# Patient Record
Sex: FEMALE | Race: BLACK OR AFRICAN AMERICAN | NOT HISPANIC OR LATINO | Employment: FULL TIME | URBAN - METROPOLITAN AREA
[De-identification: names, ages, dates, MRNs, and addresses within clinical notes are randomized per-mention and may not be internally consistent; named-entity substitution may affect disease eponyms.]

---

## 2022-06-08 ENCOUNTER — HOSPITAL ENCOUNTER (OUTPATIENT)
Facility: HOSPITAL | Age: 42
LOS: 1 days | Discharge: HOME | End: 2022-06-09
Attending: INTERNAL MEDICINE | Admitting: INTERNAL MEDICINE
Payer: MEDICAID

## 2022-06-08 DIAGNOSIS — K52.9 AGE (ACUTE GASTROENTERITIS): Primary | ICD-10-CM

## 2022-06-08 DIAGNOSIS — K80.20 CALCULUS OF GALLBLADDER WITHOUT CHOLECYSTITIS WITHOUT OBSTRUCTION: ICD-10-CM

## 2022-06-08 PROBLEM — I10 HYPERTENSION: Status: ACTIVE | Noted: 2022-06-08

## 2022-06-08 PROBLEM — E03.9 HYPOTHYROIDISM: Status: ACTIVE | Noted: 2022-06-08

## 2022-06-08 PROBLEM — I82.622 ACUTE DEEP VEIN THROMBOSIS (DVT) OF BRACHIAL VEIN OF LEFT UPPER EXTREMITY: Status: ACTIVE | Noted: 2022-06-08

## 2022-06-08 PROBLEM — E03.9 HYPOTHYROIDISM: Chronic | Status: ACTIVE | Noted: 2022-06-08

## 2022-06-08 PROBLEM — K57.30 DIVERTICULOSIS OF COLON: Status: ACTIVE | Noted: 2022-06-08

## 2022-06-08 PROBLEM — E66.01 MORBID OBESITY: Chronic | Status: ACTIVE | Noted: 2022-06-08

## 2022-06-08 PROBLEM — I10 HYPERTENSION: Chronic | Status: ACTIVE | Noted: 2022-06-08

## 2022-06-08 PROBLEM — R10.9 ACUTE LEFT FLANK PAIN: Status: ACTIVE | Noted: 2022-06-08

## 2022-06-08 LAB
ALBUMIN SERPL-MCNC: 3.6 GM/DL (ref 3.5–5)
ALBUMIN/GLOB SERPL: 0.7 RATIO (ref 1.1–2)
ALP SERPL-CCNC: 47 UNIT/L (ref 40–150)
ALT SERPL-CCNC: 12 UNIT/L (ref 0–55)
APPEARANCE UR: ABNORMAL
AST SERPL-CCNC: 17 UNIT/L (ref 5–34)
B-HCG SERPL QL: NEGATIVE
BACTERIA #/AREA URNS AUTO: ABNORMAL /HPF
BASOPHILS # BLD AUTO: 0.06 X10(3)/MCL (ref 0–0.2)
BASOPHILS NFR BLD AUTO: 0.7 %
BILIRUB UR QL STRIP.AUTO: NEGATIVE MG/DL
BILIRUBIN DIRECT+TOT PNL SERPL-MCNC: 0.5 MG/DL
BUN SERPL-MCNC: 8 MG/DL (ref 7–18.7)
CALCIUM SERPL-MCNC: 10.3 MG/DL (ref 8.4–10.2)
CHLORIDE SERPL-SCNC: 105 MMOL/L (ref 98–107)
CO2 SERPL-SCNC: 22 MMOL/L (ref 22–29)
COLOR UR AUTO: ABNORMAL
CREAT SERPL-MCNC: 0.89 MG/DL (ref 0.55–1.02)
EOSINOPHIL # BLD AUTO: 0.01 X10(3)/MCL (ref 0–0.9)
EOSINOPHIL NFR BLD AUTO: 0.1 %
ERYTHROCYTE [DISTWIDTH] IN BLOOD BY AUTOMATED COUNT: 12.6 % (ref 11.5–17)
GLOBULIN SER-MCNC: 5.3 GM/DL (ref 2.4–3.5)
GLUCOSE SERPL-MCNC: 122 MG/DL (ref 74–100)
GLUCOSE UR QL STRIP.AUTO: NEGATIVE MG/DL
HCT VFR BLD AUTO: 36.8 % (ref 37–47)
HGB BLD-MCNC: 11.8 GM/DL (ref 12–16)
IMM GRANULOCYTES # BLD AUTO: 0.03 X10(3)/MCL (ref 0–0.02)
IMM GRANULOCYTES NFR BLD AUTO: 0.4 % (ref 0–0.43)
KETONES UR QL STRIP.AUTO: NEGATIVE MG/DL
LEUKOCYTE ESTERASE UR QL STRIP.AUTO: NEGATIVE UNIT/L
LYMPHOCYTES # BLD AUTO: 1.52 X10(3)/MCL (ref 0.6–4.6)
LYMPHOCYTES NFR BLD AUTO: 18.2 %
MCH RBC QN AUTO: 29.8 PG (ref 27–31)
MCHC RBC AUTO-ENTMCNC: 32.1 MG/DL (ref 33–36)
MCV RBC AUTO: 92.9 FL (ref 80–94)
MONOCYTES # BLD AUTO: 0.3 X10(3)/MCL (ref 0.1–1.3)
MONOCYTES NFR BLD AUTO: 3.6 %
NEUTROPHILS # BLD AUTO: 6.4 X10(3)/MCL (ref 2.1–9.2)
NEUTROPHILS NFR BLD AUTO: 77 %
NITRITE UR QL STRIP.AUTO: NEGATIVE
PH UR STRIP.AUTO: 7.5 [PH]
PLATELET # BLD AUTO: 211 X10(3)/MCL (ref 130–400)
PMV BLD AUTO: 10.2 FL (ref 9.4–12.4)
POTASSIUM SERPL-SCNC: 3.7 MMOL/L (ref 3.5–5.1)
PROT SERPL-MCNC: 8.9 GM/DL (ref 6.4–8.3)
PROT UR QL STRIP.AUTO: ABNORMAL MG/DL
RBC # BLD AUTO: 3.96 X10(6)/MCL (ref 4.2–5.4)
RBC #/AREA URNS AUTO: ABNORMAL /HPF
RBC UR QL AUTO: ABNORMAL UNIT/L
SARS-COV-2 RNA RESP QL NAA+PROBE: NOT DETECTED
SODIUM SERPL-SCNC: 136 MMOL/L (ref 136–145)
SP GR UR STRIP.AUTO: 1.02
SQUAMOUS #/AREA URNS AUTO: ABNORMAL /LPF
UROBILINOGEN UR STRIP-ACNC: 0.2 MG/DL
WBC # SPEC AUTO: 8.4 X10(3)/MCL (ref 4.5–11.5)
WBC #/AREA URNS AUTO: ABNORMAL /HPF

## 2022-06-08 PROCEDURE — 63600175 PHARM REV CODE 636 W HCPCS: Performed by: INTERNAL MEDICINE

## 2022-06-08 PROCEDURE — 11000001 HC ACUTE MED/SURG PRIVATE ROOM

## 2022-06-08 PROCEDURE — 96374 THER/PROPH/DIAG INJ IV PUSH: CPT

## 2022-06-08 PROCEDURE — 80053 COMPREHEN METABOLIC PANEL: CPT | Performed by: INTERNAL MEDICINE

## 2022-06-08 PROCEDURE — 94761 N-INVAS EAR/PLS OXIMETRY MLT: CPT

## 2022-06-08 PROCEDURE — G0378 HOSPITAL OBSERVATION PER HR: HCPCS

## 2022-06-08 PROCEDURE — A4216 STERILE WATER/SALINE, 10 ML: HCPCS | Performed by: INTERNAL MEDICINE

## 2022-06-08 PROCEDURE — 81001 URINALYSIS AUTO W/SCOPE: CPT | Performed by: INTERNAL MEDICINE

## 2022-06-08 PROCEDURE — 96372 THER/PROPH/DIAG INJ SC/IM: CPT | Performed by: INTERNAL MEDICINE

## 2022-06-08 PROCEDURE — 99285 EMERGENCY DEPT VISIT HI MDM: CPT | Mod: 25

## 2022-06-08 PROCEDURE — 96372 THER/PROPH/DIAG INJ SC/IM: CPT | Mod: 59 | Performed by: INTERNAL MEDICINE

## 2022-06-08 PROCEDURE — 87635 SARS-COV-2 COVID-19 AMP PRB: CPT | Performed by: INTERNAL MEDICINE

## 2022-06-08 PROCEDURE — 85025 COMPLETE CBC W/AUTO DIFF WBC: CPT | Performed by: INTERNAL MEDICINE

## 2022-06-08 PROCEDURE — 36415 COLL VENOUS BLD VENIPUNCTURE: CPT | Performed by: INTERNAL MEDICINE

## 2022-06-08 PROCEDURE — 25000003 PHARM REV CODE 250: Performed by: INTERNAL MEDICINE

## 2022-06-08 PROCEDURE — 81025 URINE PREGNANCY TEST: CPT | Performed by: INTERNAL MEDICINE

## 2022-06-08 RX ORDER — KETOROLAC TROMETHAMINE 30 MG/ML
15 INJECTION, SOLUTION INTRAMUSCULAR; INTRAVENOUS EVERY 8 HOURS PRN
Status: DISCONTINUED | OUTPATIENT
Start: 2022-06-08 | End: 2022-06-09 | Stop reason: HOSPADM

## 2022-06-08 RX ORDER — ACETAMINOPHEN 325 MG/1
650 TABLET ORAL EVERY 6 HOURS PRN
Status: DISCONTINUED | OUTPATIENT
Start: 2022-06-08 | End: 2022-06-09 | Stop reason: HOSPADM

## 2022-06-08 RX ORDER — PROCHLORPERAZINE EDISYLATE 5 MG/ML
2.5 INJECTION INTRAMUSCULAR; INTRAVENOUS EVERY 6 HOURS PRN
Status: DISCONTINUED | OUTPATIENT
Start: 2022-06-08 | End: 2022-06-09 | Stop reason: HOSPADM

## 2022-06-08 RX ORDER — LEVOTHYROXINE SODIUM 75 UG/1
75 TABLET ORAL DAILY
COMMUNITY
Start: 2022-04-09 | End: 2022-08-25 | Stop reason: SDUPTHER

## 2022-06-08 RX ORDER — ACETAMINOPHEN 325 MG/1
650 TABLET ORAL EVERY 8 HOURS PRN
Status: DISCONTINUED | OUTPATIENT
Start: 2022-06-08 | End: 2022-06-08

## 2022-06-08 RX ORDER — MAG HYDROX/ALUMINUM HYD/SIMETH 200-200-20
30 SUSPENSION, ORAL (FINAL DOSE FORM) ORAL EVERY 6 HOURS PRN
Status: DISCONTINUED | OUTPATIENT
Start: 2022-06-08 | End: 2022-06-09 | Stop reason: HOSPADM

## 2022-06-08 RX ORDER — METHOCARBAMOL 500 MG/1
500 TABLET, FILM COATED ORAL EVERY 6 HOURS PRN
Status: DISCONTINUED | OUTPATIENT
Start: 2022-06-08 | End: 2022-06-09 | Stop reason: HOSPADM

## 2022-06-08 RX ORDER — KETOROLAC TROMETHAMINE 30 MG/ML
30 INJECTION, SOLUTION INTRAMUSCULAR; INTRAVENOUS ONCE
Status: DISCONTINUED | OUTPATIENT
Start: 2022-06-08 | End: 2022-06-08

## 2022-06-08 RX ORDER — AMLODIPINE BESYLATE 5 MG/1
5 TABLET ORAL DAILY
COMMUNITY
Start: 2022-04-09

## 2022-06-08 RX ORDER — DIPHENHYDRAMINE HYDROCHLORIDE 50 MG/ML
12.5 INJECTION INTRAMUSCULAR; INTRAVENOUS EVERY 6 HOURS PRN
Status: DISCONTINUED | OUTPATIENT
Start: 2022-06-08 | End: 2022-06-09 | Stop reason: HOSPADM

## 2022-06-08 RX ORDER — HYDROMORPHONE HYDROCHLORIDE 2 MG/ML
0.5 INJECTION, SOLUTION INTRAMUSCULAR; INTRAVENOUS; SUBCUTANEOUS EVERY 4 HOURS PRN
Status: DISCONTINUED | OUTPATIENT
Start: 2022-06-08 | End: 2022-06-08

## 2022-06-08 RX ORDER — ONDANSETRON 2 MG/ML
8 INJECTION INTRAMUSCULAR; INTRAVENOUS EVERY 6 HOURS PRN
Status: DISCONTINUED | OUTPATIENT
Start: 2022-06-08 | End: 2022-06-09 | Stop reason: HOSPADM

## 2022-06-08 RX ORDER — MEPERIDINE HYDROCHLORIDE 50 MG/ML
25 INJECTION INTRAMUSCULAR; INTRAVENOUS; SUBCUTANEOUS ONCE
Status: COMPLETED | OUTPATIENT
Start: 2022-06-08 | End: 2022-06-08

## 2022-06-08 RX ORDER — SODIUM CHLORIDE 0.9 % (FLUSH) 0.9 %
10 SYRINGE (ML) INJECTION EVERY 8 HOURS
Status: DISCONTINUED | OUTPATIENT
Start: 2022-06-08 | End: 2022-06-09 | Stop reason: HOSPADM

## 2022-06-08 RX ORDER — LOPERAMIDE HYDROCHLORIDE 2 MG/1
2 CAPSULE ORAL 4 TIMES DAILY PRN
Status: DISCONTINUED | OUTPATIENT
Start: 2022-06-08 | End: 2022-06-09 | Stop reason: HOSPADM

## 2022-06-08 RX ORDER — LEVOTHYROXINE SODIUM 75 UG/1
75 TABLET ORAL DAILY
Status: DISCONTINUED | OUTPATIENT
Start: 2022-06-09 | End: 2022-06-09 | Stop reason: HOSPADM

## 2022-06-08 RX ORDER — KETOROLAC TROMETHAMINE 30 MG/ML
30 INJECTION, SOLUTION INTRAMUSCULAR; INTRAVENOUS
Status: COMPLETED | OUTPATIENT
Start: 2022-06-08 | End: 2022-06-08

## 2022-06-08 RX ORDER — PROCHLORPERAZINE EDISYLATE 5 MG/ML
10 INJECTION INTRAMUSCULAR; INTRAVENOUS
Status: COMPLETED | OUTPATIENT
Start: 2022-06-08 | End: 2022-06-08

## 2022-06-08 RX ORDER — AMLODIPINE BESYLATE 10 MG/1
10 TABLET ORAL DAILY
Status: DISCONTINUED | OUTPATIENT
Start: 2022-06-08 | End: 2022-06-09 | Stop reason: HOSPADM

## 2022-06-08 RX ORDER — ENOXAPARIN SODIUM 100 MG/ML
40 INJECTION SUBCUTANEOUS EVERY 24 HOURS
Status: DISCONTINUED | OUTPATIENT
Start: 2022-06-08 | End: 2022-06-09 | Stop reason: HOSPADM

## 2022-06-08 RX ADMIN — KETOROLAC TROMETHAMINE 30 MG: 30 INJECTION, SOLUTION INTRAMUSCULAR; INTRAVENOUS at 11:06

## 2022-06-08 RX ADMIN — ENOXAPARIN SODIUM 40 MG: 100 INJECTION SUBCUTANEOUS at 06:06

## 2022-06-08 RX ADMIN — MEPERIDINE HYDROCHLORIDE 25 MG: 50 INJECTION INTRAMUSCULAR; INTRAVENOUS; SUBCUTANEOUS at 01:06

## 2022-06-08 RX ADMIN — HYDROMORPHONE HYDROCHLORIDE 0.5 MG: 2 INJECTION INTRAMUSCULAR; INTRAVENOUS; SUBCUTANEOUS at 05:06

## 2022-06-08 RX ADMIN — AMLODIPINE BESYLATE 10 MG: 10 TABLET ORAL at 06:06

## 2022-06-08 RX ADMIN — PROCHLORPERAZINE EDISYLATE 10 MG: 5 INJECTION INTRAMUSCULAR; INTRAVENOUS at 11:06

## 2022-06-08 RX ADMIN — Medication 10 ML: at 09:06

## 2022-06-08 NOTE — ED PROVIDER NOTES
06/08/2022         10:51 AM      Source of History:  History obtained from the patient.         Chief complaint:  From Nurse Triage:  Flank Pain (L flank pain with N,V   started this morning  on way to work)      HPI:  Tatiana Salgado is a 42 y.o. female presenting with Flank Pain (L flank pain with N,V   started this morning  on way to work)       Patient started having nausea and vomiting this morning and then she developed pain in the left flank which comes to the front from time to time.  Patient denies any history of nephrolithiasis, she does have history of diverticulitis on chart review although she did not reported to me.    Review of Systems   Constitutional symptoms:  Weakness, no fever, no chills.    Skin symptoms:  No rash.    Eye symptoms:  Negative except as documented in HPI.   ENMT symptoms:  No sore throat,    Respiratory symptoms:  No shortness of breath, no cough, no wheezing.    Cardiovascular symptoms:  No chest pain, no palpitations, no tachycardia.    Gastrointestinal symptoms:  Left flank abdominal pain, Nausea, Vomiting, no diarrhea, no constipation.    Genitourinary symptoms:  No dysuria,    Musculoskeletal symptoms:  No back pain,    Neurologic symptoms:  Weakness, no headache, no dizziness.    Psychiatric symptoms:  No anxiety, no depression, no substance abuse.    Allergy/immunologic symptoms:  No seasonal allergies,              Additional review of systems information: All other systems reviewed and otherwise negative.    Review of patient's allergies indicates:   Allergen Reactions    Morphine Rash       No current outpatient medications on file prior to encounter.         PMH:  As per HPI and below:    Reviewed in chart.    Past Medical History:   Diagnosis Date    Diverticulitis large intestine     DVT of upper extremity (deep vein thrombosis)     Hypertension         Past Surgical History:   Procedure Laterality Date    COLD KNIFE CONIZATION OF CERVIX      THYROIDECTOMY    "           Family History   Problem Relation Age of Onset    Heart disease Mother     Hypertension Mother     No Known Problems Father         Patient Active Problem List   Diagnosis    Acute deep vein thrombosis (DVT) of brachial vein of left upper extremity    Diverticulosis of colon    Hypertension    Multiple thyroid nodules    Hypothyroidism        Physical Exam:    /87   Pulse 71   Temp 98.5 °F (36.9 °C) (Oral)   Resp 18   Ht 5' 5" (1.651 m)   Wt 86.2 kg (190 lb)   SpO2 96%   BMI 31.62 kg/m²    Vital Signs: Reviewed as in chart.  General:  Alert, no acute distress. anxious  Skin: Normal for Ethnic Origin  Eye:  Extraocular movements are intact.   Cardiovascular:  Regular rate and rhythm, No murmur.    Respiratory:  Lungs are clear to auscultation, respirations are non-labored.    Musculoskeletal:  No deformity.   Gastrointestinal:  Soft, Nontender, Non distended, Normal bowel sounds.  Left Flank Tenderness,   Neurological:  Alert and oriented to person, place, time, and situation, normal motor observed, normal speech observed.    Psychiatric:  Cooperative, appropriate mood & affect.          Initial Impression/ Differential Dx:    MDM:      Reviewed Nurses Note. Reviewed Vital Signs.     Reviewed Pertinent old records.    42 y.o. female with with left flank pain and nausea and vomiting, denies any other complaints.      ED Workup and Course:      Orders Placed This Encounter   Procedures    CT Abdomen Pelvis  Without Contrast    HCG Qualitative Urine    Urinalysis, Reflex to Urine Culture Urine, Clean Catch    Urinalysis, Microscopic    CBC Auto Differential    Comprehensive Metabolic Panel    CBC with Differential    Admit to Inpatient                Labs Reviewed   URINALYSIS, REFLEX TO URINE CULTURE - Abnormal; Notable for the following components:       Result Value    Color, UA Light-Yellow (*)     Appearance, UA Cloudy (*)     Protein, UA Trace (*)     Blood, UA Large (*)     " All other components within normal limits   URINALYSIS, MICROSCOPIC - Abnormal; Notable for the following components:    Bacteria, UA Moderate (*)     Squamous Epithelial Cells, UA Moderate (*)     All other components within normal limits   COMPREHENSIVE METABOLIC PANEL - Abnormal; Notable for the following components:    Glucose Level 122 (*)     Calcium Level Total 10.3 (*)     Protein Total 8.9 (*)     Globulin 5.3 (*)     Albumin/Globulin Ratio 0.7 (*)     All other components within normal limits   CBC WITH DIFFERENTIAL - Abnormal; Notable for the following components:    RBC 3.96 (*)     Hgb 11.8 (*)     Hct 36.8 (*)     MCHC 32.1 (*)     IG# 0.03 (*)     All other components within normal limits   HCG QUALITATIVE URINE - Normal   CBC W/ AUTO DIFFERENTIAL    Narrative:     The following orders were created for panel order CBC Auto Differential.  Procedure                               Abnormality         Status                     ---------                               -----------         ------                     CBC with Differential[[id number]]        Abnormal            Final result                 Please view results for these tests on the individual orders.      Admission on 06/08/2022   Component Date Value Ref Range Status    Beta hCG Qualitative, Urine 06/08/2022 Negative  Negative Final    Color, UA 06/08/2022 Light-Yellow (A) Yellow, Colorless, Other, Clear Final    Appearance, UA 06/08/2022 Cloudy (A) Clear Final    Specific Gravity, UA 06/08/2022 1.025   Final    pH, UA 06/08/2022 7.5  5.0, 5.5, 6.0, 6.5, 7.0, 7.5, 8.0, 8.5 Final    Protein, UA 06/08/2022 Trace (A) Negative, 300  mg/dL Final    Glucose, UA 06/08/2022 Negative  Negative, Normal mg/dL Final    Ketones, UA 06/08/2022 Negative  Negative, +1, +2, +3, +4, +5, >=160, >=80 mg/dL Final    Blood, UA 06/08/2022 Large (A) Negative unit/L Final    Bilirubin, UA 06/08/2022 Negative  Negative mg/dL Final    Urobilinogen, UA  06/08/2022 0.2  0.2, 1.0, Normal mg/dL Final    Nitrites, UA 06/08/2022 Negative  Negative Final    Leukocyte Esterase, UA 06/08/2022 Negative  Negative, 75  unit/L Final    Bacteria, UA 06/08/2022 Moderate (A) None Seen, Rare, Occasional /HPF Final    RBC, UA 06/08/2022 0-2  None Seen, 0-2, 3-5, 0-5 /HPF Final    WBC, UA 06/08/2022 3-5  None Seen, 0-2, 3-5, 0-5 /HPF Final    Squamous Epithelial Cells, UA 06/08/2022 Moderate (A) None Seen, Rare, Occasional, Occ /LPF Final    Sodium Level 06/08/2022 136  136 - 145 mmol/L Final    Potassium Level 06/08/2022 3.7  3.5 - 5.1 mmol/L Final    Chloride 06/08/2022 105  98 - 107 mmol/L Final    Carbon Dioxide 06/08/2022 22  22 - 29 mmol/L Final    Glucose Level 06/08/2022 122 (A) 74 - 100 mg/dL Final    Blood Urea Nitrogen 06/08/2022 8.0  7.0 - 18.7 mg/dL Final    Creatinine 06/08/2022 0.89  0.55 - 1.02 mg/dL Final    Calcium Level Total 06/08/2022 10.3 (A) 8.4 - 10.2 mg/dL Final    Protein Total 06/08/2022 8.9 (A) 6.4 - 8.3 gm/dL Final    Albumin Level 06/08/2022 3.6  3.5 - 5.0 gm/dL Final    Globulin 06/08/2022 5.3 (A) 2.4 - 3.5 gm/dL Final    Albumin/Globulin Ratio 06/08/2022 0.7 (A) 1.1 - 2.0 ratio Final    Bilirubin Total 06/08/2022 0.5  <=1.5 mg/dL Final    Alkaline Phosphatase 06/08/2022 47  40 - 150 unit/L Final    Alanine Aminotransferase 06/08/2022 12  0 - 55 unit/L Final    Aspartate Aminotransferase 06/08/2022 17  5 - 34 unit/L Final    Estimated GFR- 06/08/2022 >60  mls/min/1.73/m2 Final    WBC 06/08/2022 8.4  4.5 - 11.5 x10(3)/mcL Final    RBC 06/08/2022 3.96 (A) 4.20 - 5.40 x10(6)/mcL Final    Hgb 06/08/2022 11.8 (A) 12.0 - 16.0 gm/dL Final    Hct 06/08/2022 36.8 (A) 37.0 - 47.0 % Final    MCV 06/08/2022 92.9  80.0 - 94.0 fL Final    MCH 06/08/2022 29.8  27.0 - 31.0 pg Final    MCHC 06/08/2022 32.1 (A) 33.0 - 36.0 mg/dL Final    RDW 06/08/2022 12.6  11.5 - 17.0 % Final    Platelet 06/08/2022 211  130 - 400  x10(3)/mcL Final    MPV 06/08/2022 10.2  9.4 - 12.4 fL Final    Neut % 06/08/2022 77.0  % Final    Lymph % 06/08/2022 18.2  % Final    Mono % 06/08/2022 3.6  % Final    Eos % 06/08/2022 0.1  % Final    Basophil % 06/08/2022 0.7  % Final    Lymph # 06/08/2022 1.52  0.6 - 4.6 x10(3)/mcL Final    Neut # 06/08/2022 6.4  2.1 - 9.2 x10(3)/mcL Final    Mono # 06/08/2022 0.30  0.1 - 1.3 x10(3)/mcL Final    Eos # 06/08/2022 0.01  0 - 0.9 x10(3)/mcL Final    Baso # 06/08/2022 0.06  0 - 0.2 x10(3)/mcL Final    IG# 06/08/2022 0.03 (A) 0 - 0.0155 x10(3)/mcL Final    IG% 06/08/2022 0.4  0 - 0.43 % Final         CT Abdomen Pelvis  Without Contrast   Final Result      1. Slight decrease in size of prominent cystic focus at the anterior left lower pelvis suspicious for a ovarian cyst measuring 5.7 cm a pelvic sonogram would allow further evaluation   2. Gas distended appendix with trace edema/stranding again identified at the right pericolic gutter which has a similar appearance to the prior exam   3. Cholelithiasis with gaseous gallstones again identified   4. Mildly prominent uterus again identified   5. Mild cardiomegaly   6. Mild mucosal prominence at a small hiatus hernia/GE junction   7. Diverticulosis coli         Electronically signed by: James Eastman   Date:    06/08/2022   Time:    11:46           Imaging Results          CT Abdomen Pelvis  Without Contrast (Final result)  Result time 06/08/22 11:46:44    Final result by James Eastman MD (06/08/22 11:46:44)                 Impression:      1. Slight decrease in size of prominent cystic focus at the anterior left lower pelvis suspicious for a ovarian cyst measuring 5.7 cm a pelvic sonogram would allow further evaluation  2. Gas distended appendix with trace edema/stranding again identified at the right pericolic gutter which has a similar appearance to the prior exam  3. Cholelithiasis with gaseous gallstones again identified  4. Mildly prominent uterus  again identified  5. Mild cardiomegaly  6. Mild mucosal prominence at a small hiatus hernia/GE junction  7. Diverticulosis coli      Electronically signed by: James Eastman  Date:    06/08/2022  Time:    11:46             Narrative:    EXAMINATION:  CT ABDOMEN PELVIS WITHOUT CONTRAST    CLINICAL HISTORY:  Flank pain, kidney stone suspected;, .    TECHNIQUE:  PATIENT RADIATION DOSE: CTDI vol(mGy) 19.70    DLP(mGycm) 667.70    As per PQRS measures, all CT scans at this facility used dose modulation, iterative reconstruction, and/or weight based dose adjustment when appropriate to reduce radiation dose to as low as reasonably achievable.    COMPARISON:  03/04/2020    FINDINGS:  Serial axial images change of the abdomen pelvis without the administration of IV contrast.  Additional sagittal and coronal reconstructions were performed. Mild degenerative change are noted to the thoracolumbar spine.  The heart is enlarged.  There is mild mucosal thickening at a small hiatus hernia/GE junction mild atelectasis and/or scarring is evident at the lung bases.  The liver, spleen, adrenal glands, and pancreas are grossly within normal limits without the administration of IV contrast.  Gaseous gallstones are again identified.  No obvious surrounding fluid collection is seen.  The kidneys are relatively symmetric in size.  No hydronephrosis is seen.  The appendix is believed to be within normal limits.  There is trace edema/stranding in the right pericolic gutter.  No dilated loops of bowel are identified.  No significant free fluid collection is seen.  The uterus is mildly prominent in size.  A prominent cystic focus is evident at the anterior left lower pelvis which has  slightly decreased in size since the prior exam measuring 5.7 cm in diameter the bladder is partially filled with fluid.  A few scattered diverticula are noted to the descending and sigmoid colon                                  ED Course as of 06/08/22 1537   Wed  Jun 08, 2022   1218 Dr. Davis says get CT with Oral Contrast and will come back after doing an emergency case and see pt. [GQ]   1247 Talked to Radiologist, who believes that giving oral contrast and doing CT again is not going to change much in terms of interpretation. He says there is gas in the Appendix and appearance is similar as of 3 months back, so he does not feel its appendicitis. [GQ]   1315 Pt. Says she gets Rash with Mrophine, but she is willing to try anything to get relief with pain. I advised her that I will give her a dose of Demerol and see if that helps.  [GQ]   1511 Talked to Dr. Davis again, says if we can admit pt. And get the Hospitalist to see pt and get a HIDA scan done, he will look into that further as to the cause of her pain. [GQ]   1529 Talked to Dr. Abel, says OK to admit and will do further workup. [GQ]      ED Course User Index  [GQ] Caridad Stern MD        Medications   ketorolac injection 30 mg (30 mg Intramuscular Given 6/8/22 1107)   prochlorperazine injection Soln 10 mg (10 mg Intramuscular Given 6/8/22 1107)   meperidine injection 25 mg (25 mg Intramuscular Given 6/8/22 1335)                    Medication List      You have not been prescribed any medications.             Diagnostic Impression:    1. Segmental colitis associated with diverticulosis    2. Calculus of gallbladder without cholecystitis without obstruction    3. Right upper quadrant abdominal pain    4. Abdominal pain         ED Disposition Condition    Admit           @DISCHARGEMEDSLIST(<NOROUTINE> error)@     Medication List      You have not been prescribed any medications.                     Medication List      You have not been prescribed any medications.          Caridad Stern MD  06/08/22 9157

## 2022-06-08 NOTE — HPI
41yo BF with Obesity, HTN, Hypothyroid presented to ED c/o acute onset Left Flank Pain this morning while driving to work. There was associated nausea and multiple episodes of vomiting bilious material. Pain described as sharp, mostly constant with intermittent episodes of abatement, occasionally radiating around left side to abd LLQ, no aggravating/relieving factors, Demerol did decrease pain significantly. Denies any previous similar episodes. Currently on end of menstrual cycle. Denies any other complaints.

## 2022-06-08 NOTE — H&P
[facility] - [facility]  [facility]  History & Physical    Patient Name: Tatiana Salgado  MRN: [id number]  Patient Class: IP- Inpatient  Admission Date: 6/8/2022  Attending Physician: Philip Abel MD   Primary Care Provider: Primary Doctor No         Patient information was obtained from patient, past medical records and ER records.     Subjective:     Principal Problem:AGE (acute gastroenteritis)    Chief Complaint:   Chief Complaint   Patient presents with    Flank Pain     L flank pain with N,V   started this morning  on way to work        HPI: 41yo BF with Obesity, HTN, Hypothyroid presented to ED c/o acute onset Left Flank Pain this morning while driving to work. There was associated nausea and multiple episodes of vomiting bilious material. Pain described as sharp, mostly constant with intermittent episodes of abatement, occasionally radiating around left side to abd LLQ, no aggravating/relieving factors, Demerol did decrease pain significantly. Denies any previous similar episodes. Currently on end of menstrual cycle. Denies any other complaints.      Past Medical History:   Diagnosis Date    Diverticulitis large intestine     DVT of upper extremity (deep vein thrombosis)     Hypertension     Postoperative hypothyroidism        Past Surgical History:   Procedure Laterality Date    COLD KNIFE CONIZATION OF CERVIX      THYROIDECTOMY         Review of patient's allergies indicates:   Allergen Reactions    Morphine Rash       No current facility-administered medications on file prior to encounter.     Current Outpatient Medications on File Prior to Encounter   Medication Sig    amLODIPine (NORVASC) 5 MG tablet Take 5 mg by mouth once daily.    levothyroxine (SYNTHROID) 75 MCG tablet Take 75 mcg by mouth once daily.     Family History       Problem Relation (Age of Onset)    Heart disease Mother    Hypertension Mother    No Known Problems Father          Tobacco Use     Smoking status: Not on file    Smokeless tobacco: Not on file   Substance and Sexual Activity    Alcohol use: Not on file    Drug use: Not on file    Sexual activity: Not on file     Review of Systems   Constitutional:  Positive for appetite change. Negative for chills, fatigue and fever.   HENT: Negative.     Eyes: Negative.    Respiratory: Negative.     Cardiovascular: Negative.    Gastrointestinal:  Positive for nausea and vomiting. Negative for abdominal distention, abdominal pain, blood in stool, constipation and diarrhea.   Genitourinary:  Positive for flank pain. Negative for difficulty urinating, dysuria, frequency, hematuria, menstrual problem and urgency.   Musculoskeletal:  Negative for arthralgias, joint swelling and neck stiffness.   Skin: Negative.    Allergic/Immunologic: Negative.    Neurological: Negative.    Hematological: Negative.    Psychiatric/Behavioral: Negative.     Objective:     Vital Signs (Most Recent):  Temp: 98.4 °F (36.9 °C) (06/08/22 1603)  Pulse: 75 (06/08/22 1603)  Resp: 18 (06/08/22 1739)  BP: (!) 150/88 (06/08/22 1603)  SpO2: 99 % (06/08/22 1603)   Vital Signs (24h Range):  Temp:  [98.4 °F (36.9 °C)-98.5 °F (36.9 °C)] 98.4 °F (36.9 °C)  Pulse:  [71-84] 75  Resp:  [18-20] 18  SpO2:  [96 %-100 %] 99 %  BP: (137-174)/() 150/88     Weight: 86.2 kg (190 lb)  Body mass index is 31.62 kg/m².    Physical Exam  Constitutional:       General: She is not in acute distress.     Appearance: She is obese. She is not ill-appearing or toxic-appearing.   HENT:      Head: Normocephalic and atraumatic.      Nose: Nose normal.      Mouth/Throat:      Mouth: Mucous membranes are moist.      Pharynx: Oropharynx is clear.   Eyes:      Extraocular Movements: Extraocular movements intact.      Conjunctiva/sclera: Conjunctivae normal.      Pupils: Pupils are equal, round, and reactive to light.   Cardiovascular:      Rate and Rhythm: Normal rate and regular rhythm.      Heart sounds: Normal  heart sounds.   Pulmonary:      Effort: Pulmonary effort is normal.      Breath sounds: Normal breath sounds.   Abdominal:      General: Bowel sounds are normal. There is no distension.      Palpations: Abdomen is soft.      Tenderness: There is no abdominal tenderness. There is no right CVA tenderness, left CVA tenderness, guarding or rebound.   Musculoskeletal:         General: Normal range of motion.      Cervical back: Normal range of motion and neck supple.   Skin:     General: Skin is warm and dry.   Neurological:      General: No focal deficit present.      Mental Status: She is alert and oriented to person, place, and time. Mental status is at baseline.   Psychiatric:         Mood and Affect: Mood normal.         Behavior: Behavior normal.         Thought Content: Thought content normal.         Judgment: Judgment normal.          Significant Labs: All pertinent labs within the past 24 hours have been reviewed.  CBC:   Recent Labs   Lab 06/08/22  1113   WBC 8.4   HGB 11.8*   HCT 36.8*        CMP:   Recent Labs   Lab 06/08/22  1113      K 3.7   CO2 22   BUN 8.0   CREATININE 0.89   CALCIUM 10.3*   ALBUMIN 3.6   BILITOT 0.5   ALKPHOS 47   AST 17   ALT 12       Urine Studies:   Recent Labs   Lab 06/08/22  1031   APPEARANCEUA Cloudy*   PROTEINUA Trace*   BILIRUBINUA Negative   UROBILINOGEN 0.2   LEUKOCYTESUR Negative   RBCUA 0-2   WBCUA 3-5   BACTERIA Moderate*       Significant Imaging: I have reviewed all pertinent imaging results/findings within the past 24 hours.    CT ABDOMEN PELVIS WITHOUT CONTRAST     CLINICAL HISTORY:  Flank pain, kidney stone suspected;, .     TECHNIQUE:  PATIENT RADIATION DOSE: CTDI vol(mGy) 19.70     DLP(mGycm) 667.70     As per PQRS measures, all CT scans at this facility used dose modulation, iterative reconstruction, and/or weight based dose adjustment when appropriate to reduce radiation dose to as low as reasonably achievable.     COMPARISON:  03/04/2020      FINDINGS:  Serial axial images change of the abdomen pelvis without the administration of IV contrast.  Additional sagittal and coronal reconstructions were performed. Mild degenerative change are noted to the thoracolumbar spine.  The heart is enlarged.  There is mild mucosal thickening at a small hiatus hernia/GE junction mild atelectasis and/or scarring is evident at the lung bases.  The liver, spleen, adrenal glands, and pancreas are grossly within normal limits without the administration of IV contrast.  Gaseous gallstones are again identified.  No obvious surrounding fluid collection is seen.  The kidneys are relatively symmetric in size.  No hydronephrosis is seen.  The appendix is believed to be within normal limits.  There is trace edema/stranding in the right pericolic gutter.  No dilated loops of bowel are identified.  No significant free fluid collection is seen.  The uterus is mildly prominent in size.  A prominent cystic focus is evident at the anterior left lower pelvis which has  slightly decreased in size since the prior exam measuring 5.7 cm in diameter the bladder is partially filled with fluid.  A few scattered diverticula are noted to the descending and sigmoid colon     Impression:     1. Slight decrease in size of prominent cystic focus at the anterior left lower pelvis suspicious for a ovarian cyst measuring 5.7 cm a pelvic sonogram would allow further evaluation  2. Gas distended appendix with trace edema/stranding again identified at the right pericolic gutter which has a similar appearance to the prior exam  3. Cholelithiasis with gaseous gallstones again identified  4. Mildly prominent uterus again identified  5. Mild cardiomegaly  6. Mild mucosal prominence at a small hiatus hernia/GE junction  7. Diverticulosis coli        Electronically signed by: James Eastman  Date:                                            06/08/2022  Time:                                            11:46        Assessment/Plan:     Patient Active Problem List    Diagnosis Date Noted    AGE (acute gastroenteritis) 06/08/2022    Acute left flank pain 06/08/2022    Acute deep vein thrombosis (DVT) of brachial vein of left upper extremity 06/08/2022    Diverticulosis of colon 06/08/2022    Hypertension 06/08/2022    Hypothyroidism 06/08/2022    Morbid obesity 06/08/2022    Multiple thyroid nodules 11/23/2016       - supportive care  - symptom control  - monitor labs  - home meds  - likely home in am          VTE Risk Mitigation (From admission, onward)         Ordered     enoxaparin injection 40 mg  Daily         06/08/22 1741     IP VTE HIGH RISK PATIENT  Once         06/08/22 1729                   Philip Abel MD  Department of Hospital Medicine   [facility]

## 2022-06-08 NOTE — SUBJECTIVE & OBJECTIVE
Past Medical History:   Diagnosis Date    Diverticulitis large intestine     DVT of upper extremity (deep vein thrombosis)     Hypertension     Postoperative hypothyroidism        Past Surgical History:   Procedure Laterality Date    COLD KNIFE CONIZATION OF CERVIX      THYROIDECTOMY         Review of patient's allergies indicates:   Allergen Reactions    Morphine Rash       No current facility-administered medications on file prior to encounter.     Current Outpatient Medications on File Prior to Encounter   Medication Sig    amLODIPine (NORVASC) 5 MG tablet Take 5 mg by mouth once daily.    levothyroxine (SYNTHROID) 75 MCG tablet Take 75 mcg by mouth once daily.     Family History       Problem Relation (Age of Onset)    Heart disease Mother    Hypertension Mother    No Known Problems Father          Tobacco Use    Smoking status: Not on file    Smokeless tobacco: Not on file   Substance and Sexual Activity    Alcohol use: Not on file    Drug use: Not on file    Sexual activity: Not on file     Review of Systems   Constitutional:  Positive for appetite change. Negative for chills, fatigue and fever.   HENT: Negative.     Eyes: Negative.    Respiratory: Negative.     Cardiovascular: Negative.    Gastrointestinal:  Positive for nausea and vomiting. Negative for abdominal distention, abdominal pain, blood in stool, constipation and diarrhea.   Genitourinary:  Positive for flank pain. Negative for difficulty urinating, dysuria, frequency, hematuria, menstrual problem and urgency.   Musculoskeletal:  Negative for arthralgias, joint swelling and neck stiffness.   Skin: Negative.    Allergic/Immunologic: Negative.    Neurological: Negative.    Hematological: Negative.    Psychiatric/Behavioral: Negative.     Objective:     Vital Signs (Most Recent):  Temp: 98.4 °F (36.9 °C) (06/08/22 1603)  Pulse: 75 (06/08/22 1603)  Resp: 18 (06/08/22 1739)  BP: (!) 150/88 (06/08/22 1603)  SpO2: 99 % (06/08/22 1603)   Vital Signs (24h  Range):  Temp:  [98.4 °F (36.9 °C)-98.5 °F (36.9 °C)] 98.4 °F (36.9 °C)  Pulse:  [71-84] 75  Resp:  [18-20] 18  SpO2:  [96 %-100 %] 99 %  BP: (137-174)/() 150/88     Weight: 86.2 kg (190 lb)  Body mass index is 31.62 kg/m².    Physical Exam  Constitutional:       General: She is not in acute distress.     Appearance: She is obese. She is not ill-appearing or toxic-appearing.   HENT:      Head: Normocephalic and atraumatic.      Nose: Nose normal.      Mouth/Throat:      Mouth: Mucous membranes are moist.      Pharynx: Oropharynx is clear.   Eyes:      Extraocular Movements: Extraocular movements intact.      Conjunctiva/sclera: Conjunctivae normal.      Pupils: Pupils are equal, round, and reactive to light.   Cardiovascular:      Rate and Rhythm: Normal rate and regular rhythm.      Heart sounds: Normal heart sounds.   Pulmonary:      Effort: Pulmonary effort is normal.      Breath sounds: Normal breath sounds.   Abdominal:      General: Bowel sounds are normal. There is no distension.      Palpations: Abdomen is soft.      Tenderness: There is no abdominal tenderness. There is no right CVA tenderness, left CVA tenderness, guarding or rebound.   Musculoskeletal:         General: Normal range of motion.      Cervical back: Normal range of motion and neck supple.   Skin:     General: Skin is warm and dry.   Neurological:      General: No focal deficit present.      Mental Status: She is alert and oriented to person, place, and time. Mental status is at baseline.   Psychiatric:         Mood and Affect: Mood normal.         Behavior: Behavior normal.         Thought Content: Thought content normal.         Judgment: Judgment normal.          Significant Labs: All pertinent labs within the past 24 hours have been reviewed.  CBC:   Recent Labs   Lab 06/08/22  1113   WBC 8.4   HGB 11.8*   HCT 36.8*        CMP:   Recent Labs   Lab 06/08/22  1113      K 3.7   CO2 22   BUN 8.0   CREATININE 0.89   CALCIUM  10.3*   ALBUMIN 3.6   BILITOT 0.5   ALKPHOS 47   AST 17   ALT 12       Urine Studies:   Recent Labs   Lab 06/08/22  1031   APPEARANCEUA Cloudy*   PROTEINUA Trace*   BILIRUBINUA Negative   UROBILINOGEN 0.2   LEUKOCYTESUR Negative   RBCUA 0-2   WBCUA 3-5   BACTERIA Moderate*       Significant Imaging: I have reviewed all pertinent imaging results/findings within the past 24 hours.    CT ABDOMEN PELVIS WITHOUT CONTRAST     CLINICAL HISTORY:  Flank pain, kidney stone suspected;, .     TECHNIQUE:  PATIENT RADIATION DOSE: CTDI vol(mGy) 19.70     DLP(mGycm) 667.70     As per PQRS measures, all CT scans at this facility used dose modulation, iterative reconstruction, and/or weight based dose adjustment when appropriate to reduce radiation dose to as low as reasonably achievable.     COMPARISON:  03/04/2020     FINDINGS:  Serial axial images change of the abdomen pelvis without the administration of IV contrast.  Additional sagittal and coronal reconstructions were performed. Mild degenerative change are noted to the thoracolumbar spine.  The heart is enlarged.  There is mild mucosal thickening at a small hiatus hernia/GE junction mild atelectasis and/or scarring is evident at the lung bases.  The liver, spleen, adrenal glands, and pancreas are grossly within normal limits without the administration of IV contrast.  Gaseous gallstones are again identified.  No obvious surrounding fluid collection is seen.  The kidneys are relatively symmetric in size.  No hydronephrosis is seen.  The appendix is believed to be within normal limits.  There is trace edema/stranding in the right pericolic gutter.  No dilated loops of bowel are identified.  No significant free fluid collection is seen.  The uterus is mildly prominent in size.  A prominent cystic focus is evident at the anterior left lower pelvis which has  slightly decreased in size since the prior exam measuring 5.7 cm in diameter the bladder is partially filled with fluid.  A  few scattered diverticula are noted to the descending and sigmoid colon     Impression:     1. Slight decrease in size of prominent cystic focus at the anterior left lower pelvis suspicious for a ovarian cyst measuring 5.7 cm a pelvic sonogram would allow further evaluation  2. Gas distended appendix with trace edema/stranding again identified at the right pericolic gutter which has a similar appearance to the prior exam  3. Cholelithiasis with gaseous gallstones again identified  4. Mildly prominent uterus again identified  5. Mild cardiomegaly  6. Mild mucosal prominence at a small hiatus hernia/GE junction  7. Diverticulosis coli        Electronically signed by: James Eastman  Date:                                            06/08/2022  Time:                                           11:46

## 2022-06-09 VITALS
WEIGHT: 190 LBS | SYSTOLIC BLOOD PRESSURE: 123 MMHG | TEMPERATURE: 98 F | RESPIRATION RATE: 18 BRPM | OXYGEN SATURATION: 98 % | HEART RATE: 60 BPM | DIASTOLIC BLOOD PRESSURE: 75 MMHG | HEIGHT: 65 IN | BODY MASS INDEX: 31.65 KG/M2

## 2022-06-09 PROBLEM — R10.9 ACUTE LEFT FLANK PAIN: Status: RESOLVED | Noted: 2022-06-08 | Resolved: 2022-06-09

## 2022-06-09 PROBLEM — K52.9 AGE (ACUTE GASTROENTERITIS): Status: RESOLVED | Noted: 2022-06-08 | Resolved: 2022-06-09

## 2022-06-09 LAB
ALBUMIN SERPL-MCNC: 3.3 GM/DL (ref 3.5–5)
ALBUMIN/GLOB SERPL: 0.7 RATIO (ref 1.1–2)
ALP SERPL-CCNC: 44 UNIT/L (ref 40–150)
ALT SERPL-CCNC: 11 UNIT/L (ref 0–55)
AST SERPL-CCNC: 15 UNIT/L (ref 5–34)
BASOPHILS # BLD AUTO: 0.06 X10(3)/MCL (ref 0–0.2)
BASOPHILS NFR BLD AUTO: 0.6 %
BILIRUBIN DIRECT+TOT PNL SERPL-MCNC: 1.2 MG/DL
BUN SERPL-MCNC: 9 MG/DL (ref 7–18.7)
CALCIUM SERPL-MCNC: 10.4 MG/DL (ref 8.4–10.2)
CHLORIDE SERPL-SCNC: 105 MMOL/L (ref 98–107)
CO2 SERPL-SCNC: 23 MMOL/L (ref 22–29)
CREAT SERPL-MCNC: 0.8 MG/DL (ref 0.55–1.02)
EOSINOPHIL # BLD AUTO: 0.03 X10(3)/MCL (ref 0–0.9)
EOSINOPHIL NFR BLD AUTO: 0.3 %
ERYTHROCYTE [DISTWIDTH] IN BLOOD BY AUTOMATED COUNT: 12.7 % (ref 11.5–17)
GLOBULIN SER-MCNC: 4.8 GM/DL (ref 2.4–3.5)
GLUCOSE SERPL-MCNC: 91 MG/DL (ref 74–100)
HCT VFR BLD AUTO: 35 % (ref 37–47)
HGB BLD-MCNC: 11.3 GM/DL (ref 12–16)
IMM GRANULOCYTES # BLD AUTO: 0.04 X10(3)/MCL (ref 0–0.02)
IMM GRANULOCYTES NFR BLD AUTO: 0.4 % (ref 0–0.43)
LYMPHOCYTES # BLD AUTO: 2.78 X10(3)/MCL (ref 0.6–4.6)
LYMPHOCYTES NFR BLD AUTO: 27.7 %
MCH RBC QN AUTO: 29.4 PG (ref 27–31)
MCHC RBC AUTO-ENTMCNC: 32.3 MG/DL (ref 33–36)
MCV RBC AUTO: 91.1 FL (ref 80–94)
MONOCYTES # BLD AUTO: 0.72 X10(3)/MCL (ref 0.1–1.3)
MONOCYTES NFR BLD AUTO: 7.2 %
NEUTROPHILS # BLD AUTO: 6.4 X10(3)/MCL (ref 2.1–9.2)
NEUTROPHILS NFR BLD AUTO: 63.8 %
PLATELET # BLD AUTO: 214 X10(3)/MCL (ref 130–400)
PMV BLD AUTO: 9.9 FL (ref 9.4–12.4)
POTASSIUM SERPL-SCNC: 3.2 MMOL/L (ref 3.5–5.1)
PROT SERPL-MCNC: 8.1 GM/DL (ref 6.4–8.3)
RBC # BLD AUTO: 3.84 X10(6)/MCL (ref 4.2–5.4)
SODIUM SERPL-SCNC: 137 MMOL/L (ref 136–145)
WBC # SPEC AUTO: 10 X10(3)/MCL (ref 4.5–11.5)

## 2022-06-09 PROCEDURE — G0378 HOSPITAL OBSERVATION PER HR: HCPCS

## 2022-06-09 PROCEDURE — 36415 COLL VENOUS BLD VENIPUNCTURE: CPT | Performed by: INTERNAL MEDICINE

## 2022-06-09 PROCEDURE — 94761 N-INVAS EAR/PLS OXIMETRY MLT: CPT

## 2022-06-09 PROCEDURE — 85025 COMPLETE CBC W/AUTO DIFF WBC: CPT | Performed by: INTERNAL MEDICINE

## 2022-06-09 PROCEDURE — 25000003 PHARM REV CODE 250: Performed by: INTERNAL MEDICINE

## 2022-06-09 PROCEDURE — 80053 COMPREHEN METABOLIC PANEL: CPT | Performed by: INTERNAL MEDICINE

## 2022-06-09 RX ADMIN — AMLODIPINE BESYLATE 10 MG: 10 TABLET ORAL at 09:06

## 2022-06-09 RX ADMIN — LEVOTHYROXINE SODIUM 75 MCG: 75 TABLET ORAL at 09:06

## 2022-06-09 NOTE — PLAN OF CARE
Pt has no PCP.  Faxed face sheat and info to [facility] at fax # [phone], for them to call pt with an apt date and time.

## 2022-06-09 NOTE — DISCHARGE INSTRUCTIONS
Your hospital information was faxed to [facility], for you to get an apt to establish a PCP.  Please call them and follow up to get your apt date and time.  Phone # [phone]

## 2022-06-23 NOTE — DISCHARGE SUMMARY
[facility]  Medical Surgical Unit  Hospital Medicine  Discharge Summary      Patient Name: Tatiana Salgado  MRN: [id number]  Patient Class: OP- Observation  Admission Date: 6/8/2022  Hospital Length of Stay: 1 days  Discharge Date and Time: 6/9/2022 12:42 PM  Attending Physician: No att. providers found   Discharging Provider: Philip Abel MD  Primary Care Provider: Primary Doctor No      HPI:   43yo BF with Obesity, HTN, Hypothyroid presented to ED c/o acute onset Left Flank Pain this morning while driving to work. There was associated nausea and multiple episodes of vomiting bilious material. Pain described as sharp, mostly constant with intermittent episodes of abatement, occasionally radiating around left side to abd LLQ, no aggravating/relieving factors, Demerol did decrease pain significantly. Denies any previous similar episodes. Currently on end of menstrual cycle. Denies any other complaints.      * No surgery found *      Hospital Course:   Pt treated symptomatically, improved and discharged home.    Goals of Care Treatment Preferences:  Code Status: Full Code      Consults:     No new Assessment & Plan notes have been filed under this hospital service since the last note was generated.  Service: Hospital Medicine    Final Active Diagnoses:    Diagnosis Date Noted POA    Hypertension [I10] 06/08/2022 Yes     Chronic    Hypothyroidism [E03.9] 06/08/2022 Yes     Chronic    Morbid obesity [E66.01] 06/08/2022 Yes     Chronic      Problems Resolved During this Admission:    Diagnosis Date Noted Date Resolved POA    PRINCIPAL PROBLEM:  AGE (acute gastroenteritis) [K52.9] 06/08/2022 06/09/2022 Yes    Acute left flank pain [R10.9] 06/08/2022 06/09/2022 Yes       Discharged Condition: stable    Disposition: Home or Self Care    Follow Up:   Follow-up Information     HERNAN Casanova. Schedule an appointment as soon as possible for a visit in 1 week(s).    Specialty: Family Medicine  Why:  Hypercalcemia workup and establish primary care  Contact information:  [address] [place] [address]  [phone]                       Patient Instructions:      Diet Adult Regular     Activity as tolerated         Pending Diagnostic Studies:     None         Medications:  Reconciled Home Medications:      Medication List      CONTINUE taking these medications    amLODIPine 5 MG tablet  Commonly known as: NORVASC  Take 5 mg by mouth once daily.     levothyroxine 75 MCG tablet  Commonly known as: SYNTHROID  Take 75 mcg by mouth once daily.            Indwelling Lines/Drains at time of discharge:   Lines/Drains/Airways     None                 Time spent on the discharge of patient: 32 minutes         Philip Abel MD  Department of Hospital Medicine  [facility] - Medical Surgical Unit

## 2022-07-06 DIAGNOSIS — Z00.00 ENCOUNTER FOR WELLNESS EXAMINATION: Primary | ICD-10-CM

## 2022-07-19 ENCOUNTER — LAB VISIT (OUTPATIENT)
Dept: LAB | Facility: HOSPITAL | Age: 42
End: 2022-07-19
Attending: REGISTERED NURSE
Payer: MEDICAID

## 2022-07-19 DIAGNOSIS — Z00.00 ENCOUNTER FOR WELLNESS EXAMINATION: ICD-10-CM

## 2022-07-19 LAB
CHOLEST SERPL-MCNC: 199 MG/DL
CHOLEST/HDLC SERPL: 5 {RATIO} (ref 0–5)
DEPRECATED CALCIDIOL+CALCIFEROL SERPL-MC: 19.3 NG/ML (ref 30–80)
EST. AVERAGE GLUCOSE BLD GHB EST-MCNC: 102.5 MG/DL
HBA1C MFR BLD: 5.2 %
HDLC SERPL-MCNC: 42 MG/DL (ref 35–60)
LDLC SERPL CALC-MCNC: 143 MG/DL (ref 50–140)
TRIGL SERPL-MCNC: 71 MG/DL (ref 37–140)
TSH SERPL-ACNC: 1.91 UIU/ML (ref 0.35–4.94)
VLDLC SERPL CALC-MCNC: 14 MG/DL

## 2022-07-19 PROCEDURE — 36415 COLL VENOUS BLD VENIPUNCTURE: CPT

## 2022-07-19 PROCEDURE — 84443 ASSAY THYROID STIM HORMONE: CPT

## 2022-07-19 PROCEDURE — 82306 VITAMIN D 25 HYDROXY: CPT

## 2022-07-19 PROCEDURE — 80061 LIPID PANEL: CPT

## 2022-07-19 PROCEDURE — 83036 HEMOGLOBIN GLYCOSYLATED A1C: CPT

## 2022-07-20 ENCOUNTER — OFFICE VISIT (OUTPATIENT)
Dept: FAMILY MEDICINE | Facility: CLINIC | Age: 42
End: 2022-07-20
Payer: MEDICAID

## 2022-07-20 VITALS
OXYGEN SATURATION: 97 % | BODY MASS INDEX: 32.99 KG/M2 | WEIGHT: 198 LBS | SYSTOLIC BLOOD PRESSURE: 122 MMHG | HEART RATE: 84 BPM | HEIGHT: 65 IN | TEMPERATURE: 98 F | DIASTOLIC BLOOD PRESSURE: 78 MMHG | RESPIRATION RATE: 18 BRPM

## 2022-07-20 DIAGNOSIS — Z76.89 ENCOUNTER TO ESTABLISH CARE: ICD-10-CM

## 2022-07-20 DIAGNOSIS — K30 INDIGESTION: ICD-10-CM

## 2022-07-20 DIAGNOSIS — K80.20 GALLSTONES: ICD-10-CM

## 2022-07-20 DIAGNOSIS — E55.9 VITAMIN D DEFICIENCY: ICD-10-CM

## 2022-07-20 DIAGNOSIS — Z00.00 WELLNESS EXAMINATION: Primary | ICD-10-CM

## 2022-07-20 DIAGNOSIS — Z12.31 BREAST CANCER SCREENING BY MAMMOGRAM: ICD-10-CM

## 2022-07-20 PROCEDURE — 3008F PR BODY MASS INDEX (BMI) DOCUMENTED: ICD-10-PCS | Mod: CPTII,,, | Performed by: REGISTERED NURSE

## 2022-07-20 PROCEDURE — 1160F RVW MEDS BY RX/DR IN RCRD: CPT | Mod: CPTII,,, | Performed by: REGISTERED NURSE

## 2022-07-20 PROCEDURE — 99215 OFFICE O/P EST HI 40 MIN: CPT | Mod: PBBFAC | Performed by: REGISTERED NURSE

## 2022-07-20 PROCEDURE — 3078F PR MOST RECENT DIASTOLIC BLOOD PRESSURE < 80 MM HG: ICD-10-PCS | Mod: CPTII,,, | Performed by: REGISTERED NURSE

## 2022-07-20 PROCEDURE — 3078F DIAST BP <80 MM HG: CPT | Mod: CPTII,,, | Performed by: REGISTERED NURSE

## 2022-07-20 PROCEDURE — 1159F PR MEDICATION LIST DOCUMENTED IN MEDICAL RECORD: ICD-10-PCS | Mod: CPTII,,, | Performed by: REGISTERED NURSE

## 2022-07-20 PROCEDURE — 99999 PR PBB SHADOW E&M-EST. PATIENT-LVL V: ICD-10-PCS | Mod: PBBFAC,,, | Performed by: REGISTERED NURSE

## 2022-07-20 PROCEDURE — 3008F BODY MASS INDEX DOCD: CPT | Mod: CPTII,,, | Performed by: REGISTERED NURSE

## 2022-07-20 PROCEDURE — 3074F SYST BP LT 130 MM HG: CPT | Mod: CPTII,,, | Performed by: REGISTERED NURSE

## 2022-07-20 PROCEDURE — 1160F PR REVIEW ALL MEDS BY PRESCRIBER/CLIN PHARMACIST DOCUMENTED: ICD-10-PCS | Mod: CPTII,,, | Performed by: REGISTERED NURSE

## 2022-07-20 PROCEDURE — 1159F MED LIST DOCD IN RCRD: CPT | Mod: CPTII,,, | Performed by: REGISTERED NURSE

## 2022-07-20 PROCEDURE — 3074F PR MOST RECENT SYSTOLIC BLOOD PRESSURE < 130 MM HG: ICD-10-PCS | Mod: CPTII,,, | Performed by: REGISTERED NURSE

## 2022-07-20 PROCEDURE — 99203 OFFICE O/P NEW LOW 30 MIN: CPT | Mod: S$PBB,,, | Performed by: REGISTERED NURSE

## 2022-07-20 PROCEDURE — 99999 PR PBB SHADOW E&M-EST. PATIENT-LVL V: CPT | Mod: PBBFAC,,, | Performed by: REGISTERED NURSE

## 2022-07-20 PROCEDURE — 99203 PR OFFICE/OUTPT VISIT, NEW, LEVL III, 30-44 MIN: ICD-10-PCS | Mod: S$PBB,,, | Performed by: REGISTERED NURSE

## 2022-07-20 RX ORDER — ERGOCALCIFEROL 1.25 MG/1
50000 CAPSULE ORAL
Qty: 8 CAPSULE | Refills: 0 | Status: SHIPPED | OUTPATIENT
Start: 2022-07-20 | End: 2022-09-08

## 2022-07-20 NOTE — PROGRESS NOTES
Subjective:       Patient ID: Tatiana Salgado is a 42 y.o. female.    Chief Complaint: hospital f/u, heartburn, wellness     Patient is a 42-year-old female who presents to the clinic today to establish care and for wellness examination.  Patient has past medical history of hypertension and hypothyroidism current controlled on meds.  Patient complaining of abdominal pain and indigestion today.    Review of Systems   Constitutional: Negative.  Negative for chills, fatigue and fever.   HENT: Negative.    Eyes: Negative.    Respiratory: Negative.  Negative for chest tightness and shortness of breath.    Cardiovascular: Negative.  Negative for chest pain and palpitations.   Gastrointestinal: Positive for abdominal pain and nausea. Negative for change in bowel habit and change in bowel habit.   Endocrine: Negative.    Genitourinary: Negative.    Musculoskeletal: Negative.    Integumentary:  Negative.   Allergic/Immunologic: Negative.    Neurological: Negative.  Negative for dizziness and syncope.   Hematological: Negative.    Psychiatric/Behavioral: Negative.          Objective:      Physical Exam  Constitutional:       Appearance: Normal appearance.   HENT:      Head: Normocephalic.      Right Ear: Tympanic membrane normal.      Left Ear: Tympanic membrane normal.      Nose: No congestion or rhinorrhea.      Mouth/Throat:      Mouth: Mucous membranes are moist.      Pharynx: No posterior oropharyngeal erythema.   Eyes:      Pupils: Pupils are equal, round, and reactive to light.   Cardiovascular:      Rate and Rhythm: Normal rate and regular rhythm.      Pulses: Normal pulses.      Heart sounds: Normal heart sounds.   Pulmonary:      Effort: Pulmonary effort is normal. No respiratory distress.      Breath sounds: Normal breath sounds.   Abdominal:      General: Abdomen is flat. Bowel sounds are normal.      Palpations: Abdomen is soft.      Tenderness: There is abdominal tenderness in the right upper quadrant and  epigastric area.   Musculoskeletal:         General: No swelling or tenderness. Normal range of motion.      Cervical back: Normal range of motion and neck supple. No rigidity.   Lymphadenopathy:      Cervical: No cervical adenopathy.   Skin:     General: Skin is warm and dry.      Capillary Refill: Capillary refill takes less than 2 seconds.   Neurological:      General: No focal deficit present.      Mental Status: She is alert and oriented to person, place, and time.   Psychiatric:         Mood and Affect: Mood normal.         Behavior: Behavior normal.         Thought Content: Thought content normal.         Judgment: Judgment normal.         Assessment:       Problem List Items Addressed This Visit    None     Visit Diagnoses     Wellness examination    -  Primary    Encounter to establish care        Indigestion        Relevant Orders    Ambulatory referral/consult to Gastroenterology    Gallstones        Relevant Orders    Ambulatory referral/consult to General Surgery    Vitamin D deficiency        Relevant Medications    ergocalciferol (ERGOCALCIFEROL) 50,000 unit Cap          Plan:   Healthy lifestyle discussed with patient today.  Labs reviewed.  Patient instructed to increase physical activity to 30 minutes most days as tolerated  Low-sodium diet discussed with patient.  Vitamin D 3 40307 IU 1 tab p.o. q.week x8 weeks sent to pharmacy on file  Referral to GI for indigestion and surgical referral for gallstones sent today  Mammogram ordered today  Patient to return to clinic in 1 year for wellness exam or p.r.n.

## 2022-07-20 NOTE — PATIENT INSTRUCTIONS
Healthy lifestyle discussed with patient today.  Labs reviewed.  Patient instructed to increase physical activity to 30 minutes most days as tolerated  Low-sodium diet discussed with patient.  Vitamin D 3 87701 IU 1 tab p.o. q.week x8 weeks sent to pharmacy on file  Referral to GI for indigestion and surgical referral for gallstones sent today  Mammogram ordered today  Patient to return to clinic in 1 year for wellness exam or p.r.n.

## 2022-08-24 ENCOUNTER — DOCUMENTATION ONLY (OUTPATIENT)
Dept: FAMILY MEDICINE | Facility: CLINIC | Age: 42
End: 2022-08-24
Payer: MEDICAID

## 2022-08-24 DIAGNOSIS — E04.2 MULTIPLE THYROID NODULES: Primary | ICD-10-CM

## 2022-08-24 RX ORDER — LEVOTHYROXINE SODIUM 75 UG/1
75 TABLET ORAL DAILY
Qty: 30 TABLET | Refills: 2 | Status: CANCELLED | OUTPATIENT
Start: 2022-08-24 | End: 2022-11-22

## 2022-08-25 DIAGNOSIS — E03.9 HYPOTHYROIDISM, UNSPECIFIED TYPE: Primary | ICD-10-CM

## 2022-08-25 RX ORDER — LEVOTHYROXINE SODIUM 75 UG/1
75 TABLET ORAL DAILY
Qty: 30 TABLET | Refills: 2 | Status: SHIPPED | OUTPATIENT
Start: 2022-08-25 | End: 2022-11-11

## 2022-10-04 ENCOUNTER — HOSPITAL ENCOUNTER (OUTPATIENT)
Dept: RADIOLOGY | Facility: HOSPITAL | Age: 42
Discharge: HOME | End: 2022-10-04
Attending: REGISTERED NURSE
Payer: MEDICAID

## 2022-10-04 DIAGNOSIS — Z12.31 BREAST CANCER SCREENING BY MAMMOGRAM: ICD-10-CM

## 2022-10-04 PROCEDURE — 77067 MAMMO DIGITAL SCREENING BILAT WITH TOMO: ICD-10-PCS | Mod: 26,,, | Performed by: STUDENT IN AN ORGANIZED HEALTH CARE EDUCATION/TRAINING PROGRAM

## 2022-10-04 PROCEDURE — 77067 SCR MAMMO BI INCL CAD: CPT | Mod: TC

## 2022-10-04 PROCEDURE — 77063 BREAST TOMOSYNTHESIS BI: CPT | Mod: 26,,, | Performed by: STUDENT IN AN ORGANIZED HEALTH CARE EDUCATION/TRAINING PROGRAM

## 2022-10-04 PROCEDURE — 77067 SCR MAMMO BI INCL CAD: CPT | Mod: 26,,, | Performed by: STUDENT IN AN ORGANIZED HEALTH CARE EDUCATION/TRAINING PROGRAM

## 2022-10-04 PROCEDURE — 77063 MAMMO DIGITAL SCREENING BILAT WITH TOMO: ICD-10-PCS | Mod: 26,,, | Performed by: STUDENT IN AN ORGANIZED HEALTH CARE EDUCATION/TRAINING PROGRAM

## 2022-10-05 ENCOUNTER — TELEPHONE (OUTPATIENT)
Dept: FAMILY MEDICINE | Facility: CLINIC | Age: 42
End: 2022-10-05
Payer: MEDICAID

## 2022-10-05 NOTE — TELEPHONE ENCOUNTER
----- Message from HERNAN Casanova sent at 10/5/2022  9:16 AM CDT -----  Please call and let patient know her mammogram was normal

## 2023-03-20 ENCOUNTER — TELEPHONE (OUTPATIENT)
Dept: FAMILY MEDICINE | Facility: CLINIC | Age: 43
End: 2023-03-20
Payer: MEDICAID

## 2023-04-24 ENCOUNTER — OFFICE VISIT (OUTPATIENT)
Dept: FAMILY MEDICINE | Facility: CLINIC | Age: 43
End: 2023-04-24
Payer: MEDICAID

## 2023-04-24 VITALS
SYSTOLIC BLOOD PRESSURE: 135 MMHG | WEIGHT: 195 LBS | RESPIRATION RATE: 16 BRPM | TEMPERATURE: 98 F | OXYGEN SATURATION: 100 % | BODY MASS INDEX: 32.49 KG/M2 | DIASTOLIC BLOOD PRESSURE: 86 MMHG | HEART RATE: 68 BPM | HEIGHT: 65 IN

## 2023-04-24 DIAGNOSIS — E55.9 VITAMIN D DEFICIENCY: ICD-10-CM

## 2023-04-24 DIAGNOSIS — I10 HYPERTENSION, UNSPECIFIED TYPE: ICD-10-CM

## 2023-04-24 DIAGNOSIS — E03.9 HYPOTHYROIDISM, UNSPECIFIED TYPE: ICD-10-CM

## 2023-04-24 DIAGNOSIS — E66.9 OBESITY (BMI 30.0-34.9): ICD-10-CM

## 2023-04-24 DIAGNOSIS — Z00.00 WELLNESS EXAMINATION: ICD-10-CM

## 2023-04-24 DIAGNOSIS — Z82.49 FAMILY HISTORY OF CARDIOMEGALY: ICD-10-CM

## 2023-04-24 DIAGNOSIS — K21.9 GASTROESOPHAGEAL REFLUX DISEASE, UNSPECIFIED WHETHER ESOPHAGITIS PRESENT: Primary | ICD-10-CM

## 2023-04-24 PROBLEM — E66.811 OBESITY (BMI 30.0-34.9): Status: ACTIVE | Noted: 2023-04-24

## 2023-04-24 PROCEDURE — 3008F PR BODY MASS INDEX (BMI) DOCUMENTED: ICD-10-PCS | Mod: CPTII,,, | Performed by: REGISTERED NURSE

## 2023-04-24 PROCEDURE — 1159F PR MEDICATION LIST DOCUMENTED IN MEDICAL RECORD: ICD-10-PCS | Mod: CPTII,,, | Performed by: REGISTERED NURSE

## 2023-04-24 PROCEDURE — 99999 PR PBB SHADOW E&M-EST. PATIENT-LVL IV: CPT | Mod: PBBFAC,,, | Performed by: REGISTERED NURSE

## 2023-04-24 PROCEDURE — 99999 PR PBB SHADOW E&M-EST. PATIENT-LVL IV: ICD-10-PCS | Mod: PBBFAC,,, | Performed by: REGISTERED NURSE

## 2023-04-24 PROCEDURE — 1159F MED LIST DOCD IN RCRD: CPT | Mod: CPTII,,, | Performed by: REGISTERED NURSE

## 2023-04-24 PROCEDURE — 3079F PR MOST RECENT DIASTOLIC BLOOD PRESSURE 80-89 MM HG: ICD-10-PCS | Mod: CPTII,,, | Performed by: REGISTERED NURSE

## 2023-04-24 PROCEDURE — 99213 PR OFFICE/OUTPT VISIT, EST, LEVL III, 20-29 MIN: ICD-10-PCS | Mod: S$PBB,,, | Performed by: REGISTERED NURSE

## 2023-04-24 PROCEDURE — 99213 OFFICE O/P EST LOW 20 MIN: CPT | Mod: S$PBB,,, | Performed by: REGISTERED NURSE

## 2023-04-24 PROCEDURE — 3075F PR MOST RECENT SYSTOLIC BLOOD PRESS GE 130-139MM HG: ICD-10-PCS | Mod: CPTII,,, | Performed by: REGISTERED NURSE

## 2023-04-24 PROCEDURE — 3079F DIAST BP 80-89 MM HG: CPT | Mod: CPTII,,, | Performed by: REGISTERED NURSE

## 2023-04-24 PROCEDURE — 3008F BODY MASS INDEX DOCD: CPT | Mod: CPTII,,, | Performed by: REGISTERED NURSE

## 2023-04-24 PROCEDURE — 3075F SYST BP GE 130 - 139MM HG: CPT | Mod: CPTII,,, | Performed by: REGISTERED NURSE

## 2023-04-24 PROCEDURE — 99214 OFFICE O/P EST MOD 30 MIN: CPT | Mod: PBBFAC | Performed by: REGISTERED NURSE

## 2023-04-24 RX ORDER — PANTOPRAZOLE SODIUM 20 MG/1
20 TABLET, DELAYED RELEASE ORAL DAILY
Qty: 30 TABLET | Refills: 2 | Status: SHIPPED | OUTPATIENT
Start: 2023-04-24 | End: 2024-04-23

## 2023-04-24 NOTE — PROGRESS NOTES
Subjective     Patient ID: Tatiana Salgado is a 43 y.o. female.    Chief Complaint: Follow-up (Pt presents for follow up ), Hypertension (Pt states BP is getting over 200/140 with accompanied dizziness /Presented to  and was given clonidine /Started on amlodipine /Admits to orthostatic hypotension when moving from sitting to standing position), and Gastroesophageal Reflux (Pt c/o reflex with spicy foods and greasy foods /States she has a hernia and still has gallbladder)    Patient here for blood pressure follow-up and GERD, patient denies blurred vision, chest pain shortness a breath at this time.     Follow-up  Pertinent negatives include no chest pain, chills, coughing, fatigue or fever.   Hypertension  Pertinent negatives include no chest pain or shortness of breath.   Gastroesophageal Reflux  She reports no chest pain or no coughing. Pertinent negatives include no fatigue. Review of Systems   Constitutional:  Negative for chills, fatigue and fever.   Respiratory:  Negative for cough, chest tightness and shortness of breath.    Cardiovascular:  Negative for chest pain and leg swelling.   Gastrointestinal:  Positive for reflux.   Endocrine: Negative for cold intolerance and heat intolerance.   All other systems reviewed and are negative.       Objective     Physical Exam  Vitals reviewed.   Constitutional:       Appearance: Normal appearance.   HENT:      Head: Normocephalic and atraumatic.      Mouth/Throat:      Mouth: Mucous membranes are moist.   Eyes:      Pupils: Pupils are equal, round, and reactive to light.   Cardiovascular:      Rate and Rhythm: Normal rate and regular rhythm.      Pulses: Normal pulses.      Heart sounds: Normal heart sounds.   Pulmonary:      Effort: Pulmonary effort is normal.      Breath sounds: Normal breath sounds.   Abdominal:      General: Abdomen is flat. Bowel sounds are normal. There is no distension.      Palpations: Abdomen is soft.      Tenderness: There is no abdominal  tenderness. There is no guarding.   Musculoskeletal:         General: Normal range of motion.      Cervical back: Normal range of motion and neck supple.   Skin:     General: Skin is warm.      Capillary Refill: Capillary refill takes less than 2 seconds.   Neurological:      General: No focal deficit present.      Mental Status: She is alert and oriented to person, place, and time.   Psychiatric:         Mood and Affect: Mood normal.         Behavior: Behavior normal.         Thought Content: Thought content normal.         Judgment: Judgment normal.        Assessment and Plan     Problem List Items Addressed This Visit          Cardiac/Vascular    Hypertension (Chronic)       Endocrine    Obesity (BMI 30.0-34.9)       GI    Gastroesophageal reflux disease - Primary    Relevant Medications    pantoprazole (PROTONIX) 20 MG tablet   I spent a total of 30 minutes on the day of the visit.This includes face to face time and non-face to face time preparing to see the patient (eg, review of tests), obtaining and/or reviewing separately obtained history, documenting clinical information in the electronic or other health record, independently interpreting results and communicating results to the patient/family/caregiver, or care coordinator.    HTN-blood pressure currently controlled on current medication regimen, continue.  Low-sodium diet and weight loss discussed.  Continue to monitor and record blood pressure readings, report any abnormalities.    Obesity-Body mass index is 32.45 kg/m². Morbid obesity complicates all aspects of disease management from diagnostic modalities to treatment. Weight loss encouraged and health benefits explained to patient.      GERD-The pathophysiology of reflux is discussed.  Anti-reflux measures such as raising the head of the bed, avoiding tight clothing or belts, avoiding eating late at night and not lying down shortly after mealtime and achieving weight loss are discussed. Avoid ASA,  NSAID's, caffeine, peppermints, alcohol and tobacco. OTC H2 blockers and/or antacids are often very helpful for PRN use. For persisting chronic or daily symptoms, prescription strength H2 blockers or PPI's may be used. She should alert me if there are persistent symptoms, dysphagia, weight loss or GI bleeding.  GI referral sent today.    Return to clinic in June for wellness, labs to be completed prior to visit.  Cardiology referral sent today due to family history of cardiac disease  GI referral resent today

## 2023-06-14 ENCOUNTER — OFFICE VISIT (OUTPATIENT)
Dept: FAMILY MEDICINE | Facility: CLINIC | Age: 43
End: 2023-06-14
Payer: MEDICAID

## 2023-06-14 VITALS
TEMPERATURE: 97 F | HEIGHT: 65 IN | OXYGEN SATURATION: 98 % | SYSTOLIC BLOOD PRESSURE: 132 MMHG | DIASTOLIC BLOOD PRESSURE: 85 MMHG | BODY MASS INDEX: 32.49 KG/M2 | RESPIRATION RATE: 18 BRPM | HEART RATE: 66 BPM | WEIGHT: 195 LBS

## 2023-06-14 DIAGNOSIS — H65.93 MIDDLE EAR EFFUSION, BILATERAL: ICD-10-CM

## 2023-06-14 DIAGNOSIS — R09.81 NASAL SINUS CONGESTION: ICD-10-CM

## 2023-06-14 DIAGNOSIS — J32.9 SINUSITIS, UNSPECIFIED CHRONICITY, UNSPECIFIED LOCATION: Primary | ICD-10-CM

## 2023-06-14 DIAGNOSIS — N76.0 ACUTE VAGINITIS: ICD-10-CM

## 2023-06-14 PROCEDURE — 99999 PR PBB SHADOW E&M-EST. PATIENT-LVL III: CPT | Mod: PBBFAC,,, | Performed by: REGISTERED NURSE

## 2023-06-14 PROCEDURE — 3079F DIAST BP 80-89 MM HG: CPT | Mod: CPTII,,, | Performed by: REGISTERED NURSE

## 2023-06-14 PROCEDURE — 3008F PR BODY MASS INDEX (BMI) DOCUMENTED: ICD-10-PCS | Mod: CPTII,,, | Performed by: REGISTERED NURSE

## 2023-06-14 PROCEDURE — 99213 OFFICE O/P EST LOW 20 MIN: CPT | Mod: S$PBB,,, | Performed by: REGISTERED NURSE

## 2023-06-14 PROCEDURE — 99213 OFFICE O/P EST LOW 20 MIN: CPT | Mod: PBBFAC | Performed by: REGISTERED NURSE

## 2023-06-14 PROCEDURE — 99999 PR PBB SHADOW E&M-EST. PATIENT-LVL III: ICD-10-PCS | Mod: PBBFAC,,, | Performed by: REGISTERED NURSE

## 2023-06-14 PROCEDURE — 3008F BODY MASS INDEX DOCD: CPT | Mod: CPTII,,, | Performed by: REGISTERED NURSE

## 2023-06-14 PROCEDURE — 3075F PR MOST RECENT SYSTOLIC BLOOD PRESS GE 130-139MM HG: ICD-10-PCS | Mod: CPTII,,, | Performed by: REGISTERED NURSE

## 2023-06-14 PROCEDURE — 99213 PR OFFICE/OUTPT VISIT, EST, LEVL III, 20-29 MIN: ICD-10-PCS | Mod: S$PBB,,, | Performed by: REGISTERED NURSE

## 2023-06-14 PROCEDURE — 1159F PR MEDICATION LIST DOCUMENTED IN MEDICAL RECORD: ICD-10-PCS | Mod: CPTII,,, | Performed by: REGISTERED NURSE

## 2023-06-14 PROCEDURE — 3075F SYST BP GE 130 - 139MM HG: CPT | Mod: CPTII,,, | Performed by: REGISTERED NURSE

## 2023-06-14 PROCEDURE — 3079F PR MOST RECENT DIASTOLIC BLOOD PRESSURE 80-89 MM HG: ICD-10-PCS | Mod: CPTII,,, | Performed by: REGISTERED NURSE

## 2023-06-14 PROCEDURE — 1159F MED LIST DOCD IN RCRD: CPT | Mod: CPTII,,, | Performed by: REGISTERED NURSE

## 2023-06-14 RX ORDER — AMOXICILLIN AND CLAVULANATE POTASSIUM 875; 125 MG/1; MG/1
1 TABLET, FILM COATED ORAL EVERY 12 HOURS
Qty: 14 TABLET | Refills: 0 | Status: SHIPPED | OUTPATIENT
Start: 2023-06-14 | End: 2023-06-21

## 2023-06-14 RX ORDER — FLUCONAZOLE 150 MG/1
150 TABLET ORAL DAILY
Qty: 1 TABLET | Refills: 0 | Status: SHIPPED | OUTPATIENT
Start: 2023-06-14 | End: 2023-06-15

## 2023-06-14 NOTE — PROGRESS NOTES
Subjective     Patient ID: Tatiana Salgado is a 43 y.o. female.    Chief Complaint: Sinus Problem and Generalized Body Aches    43-year-old established patient who presents to clinic today complaining of congestion, sore throat, body aches, and sinus pressure x3 days.  No relief with OTC medications.  Patient denies fever and recent sick contacts.    Sinus Problem  Associated symptoms include congestion, ear pain and sinus pressure. Pertinent negatives include no chills or shortness of breath. Review of Systems   Constitutional:  Positive for fatigue. Negative for chills and fever.   HENT:  Positive for nasal congestion, ear pain and sinus pressure/congestion.    Respiratory:  Negative for shortness of breath.    Cardiovascular:  Negative for chest pain and palpitations.   All other systems reviewed and are negative.       Objective     Physical Exam  Vitals and nursing note reviewed.   Constitutional:       Appearance: Normal appearance.   HENT:      Right Ear: Tenderness present. A middle ear effusion is present.      Left Ear: Tenderness present. A middle ear effusion is present.      Nose:      Right Sinus: Maxillary sinus tenderness and frontal sinus tenderness present.      Left Sinus: Maxillary sinus tenderness and frontal sinus tenderness present.      Mouth/Throat:      Mouth: Mucous membranes are moist.   Eyes:      Pupils: Pupils are equal, round, and reactive to light.   Pulmonary:      Effort: Pulmonary effort is normal.   Musculoskeletal:         General: Normal range of motion.      Cervical back: Normal range of motion and neck supple.   Skin:     General: Skin is warm.   Neurological:      General: No focal deficit present.      Mental Status: She is alert and oriented to person, place, and time.   Psychiatric:         Mood and Affect: Mood normal.         Behavior: Behavior normal.         Thought Content: Thought content normal.         Judgment: Judgment normal.          Assessment and Plan     1.  Sinusitis, unspecified chronicity, unspecified location  - amoxicillin-clavulanate 875-125mg (AUGMENTIN) 875-125 mg per tablet; Take 1 tablet by mouth every 12 (twelve) hours. for 7 days  Dispense: 14 tablet; Refill: 0  -use OCT Tylenol or Motrin as directed for pain    2. Nasal sinus congestion  -use OTC Flonase and Zyrtec as directed    3. Middle ear effusion, bilateral  -do not submerge head into water for the next 7 days, keep ear canals clean and dry    Return to clinic if symptoms worsens/keep next scheduled appointment

## 2023-06-26 ENCOUNTER — PATIENT MESSAGE (OUTPATIENT)
Dept: ADMINISTRATIVE | Facility: HOSPITAL | Age: 43
End: 2023-06-26
Payer: MEDICAID

## 2023-07-10 ENCOUNTER — PATIENT OUTREACH (OUTPATIENT)
Dept: ADMINISTRATIVE | Facility: HOSPITAL | Age: 43
End: 2023-07-10
Payer: MEDICAID

## 2023-07-10 NOTE — PROGRESS NOTES
Population Health. Out [facility]. Reviewing patient's chart for quality metrics. I contacted patient to see if she has had a recent pap smear. Patient has not had a recent pap smear and is not seeing a GYN. Patient is interested in having Nory do her pap smear.

## 2023-07-23 ENCOUNTER — HOSPITAL ENCOUNTER (EMERGENCY)
Facility: HOSPITAL | Age: 43
Discharge: HOME | End: 2023-07-23
Attending: INTERNAL MEDICINE
Payer: MEDICAID

## 2023-07-23 VITALS
RESPIRATION RATE: 18 BRPM | HEART RATE: 74 BPM | SYSTOLIC BLOOD PRESSURE: 148 MMHG | TEMPERATURE: 99 F | DIASTOLIC BLOOD PRESSURE: 96 MMHG | BODY MASS INDEX: 32.92 KG/M2 | OXYGEN SATURATION: 98 % | WEIGHT: 197.81 LBS

## 2023-07-23 DIAGNOSIS — R52 PAIN: ICD-10-CM

## 2023-07-23 DIAGNOSIS — M25.511 ACUTE PAIN OF RIGHT SHOULDER: Primary | ICD-10-CM

## 2023-07-23 PROCEDURE — 99284 EMERGENCY DEPT VISIT MOD MDM: CPT

## 2023-07-23 PROCEDURE — 96372 THER/PROPH/DIAG INJ SC/IM: CPT | Performed by: NURSE PRACTITIONER

## 2023-07-23 PROCEDURE — 63600175 PHARM REV CODE 636 W HCPCS: Performed by: NURSE PRACTITIONER

## 2023-07-23 RX ORDER — TRAMADOL HYDROCHLORIDE 50 MG/1
50 TABLET ORAL EVERY 6 HOURS PRN
Qty: 12 TABLET | Refills: 0 | Status: SHIPPED | OUTPATIENT
Start: 2023-07-23 | End: 2023-08-17

## 2023-07-23 RX ORDER — PREDNISONE 10 MG/1
10 TABLET ORAL DAILY
Qty: 21 TABLET | Refills: 0 | Status: SHIPPED | OUTPATIENT
Start: 2023-07-23

## 2023-07-23 RX ORDER — KETOROLAC TROMETHAMINE 30 MG/ML
30 INJECTION, SOLUTION INTRAMUSCULAR; INTRAVENOUS
Status: COMPLETED | OUTPATIENT
Start: 2023-07-23 | End: 2023-07-23

## 2023-07-23 RX ADMIN — KETOROLAC TROMETHAMINE 30 MG: 30 INJECTION, SOLUTION INTRAMUSCULAR; INTRAVENOUS at 09:07

## 2023-07-24 NOTE — ED PROVIDER NOTES
Encounter Date: 7/23/2023       History     Chief Complaint   Patient presents with    Shoulder Pain     C/o R shoulder pain x3 weeks. Denies injury. Last ibuprofen 2 hours ago     Patient is a 43-year-old female who presents to the emergency department with complaints of 3 weeks of right anterior shoulder pain that she states started gradually in his worsened over the past 3 weeks.  She denies any injury trauma that occurred recent or remotely.  She states the pain is a tightness and sometimes sharp and worse with range of motion and alleviated some by rest but not completely.  She states originally she was taken Motrin for the pain which did help initially but over time has stopped being as effective.  She denies any numbness tingling paresthesias.  She has good pulses and cap refill intact to the affected arm.  She has no other complaints or associated symptoms.    Review of patient's allergies indicates:   Allergen Reactions    Morphine Rash     Past Medical History:   Diagnosis Date    Diverticulitis large intestine     DVT of upper extremity (deep vein thrombosis)     History of methicillin resistant staphylococcus aureus (MRSA)     Hypertension     Postoperative hypothyroidism      Past Surgical History:   Procedure Laterality Date    COLD KNIFE CONIZATION OF CERVIX      THYROIDECTOMY       Family History   Problem Relation Age of Onset    Heart disease Mother     Hypertension Mother     No Known Problems Father      Social History     Tobacco Use    Smoking status: Never    Smokeless tobacco: Never   Substance Use Topics    Alcohol use: Not Currently    Drug use: Never     Review of Systems   Constitutional:  Negative for activity change, appetite change and fever.   HENT:  Negative for congestion, dental problem and sore throat.    Eyes:  Negative for discharge and itching.   Respiratory:  Negative for apnea, chest tightness and shortness of breath.    Cardiovascular:  Negative for chest pain.    Gastrointestinal:  Negative for nausea.   Genitourinary:  Negative for dysuria.   Musculoskeletal:  Positive for arthralgias and myalgias. Negative for back pain, neck pain and neck stiffness.   Skin:  Negative for color change, rash and wound.   Neurological:  Negative for weakness.   Hematological:  Does not bruise/bleed easily.   All other systems reviewed and are negative.    Physical Exam     Initial Vitals [07/23/23 2043]   BP Pulse Resp Temp SpO2   (!) 158/103 75 16 99 °F (37.2 °C) 99 %      MAP       --         Physical Exam    Nursing note and vitals reviewed.  Constitutional: Vital signs are normal. She appears well-developed and well-nourished.  Non-toxic appearance. She does not have a sickly appearance.   HENT:   Head: Normocephalic and atraumatic.   Right Ear: External ear normal.   Left Ear: External ear normal.   Eyes: Conjunctivae, EOM and lids are normal. Pupils are equal, round, and reactive to light. Lids are everted and swept, no foreign bodies found.   Neck: Trachea normal and phonation normal. Neck supple. No thyroid mass and no thyromegaly present.   Normal range of motion.   Full passive range of motion without pain.     Cardiovascular:  Normal rate, regular rhythm, S1 normal, S2 normal, normal heart sounds, intact distal pulses and normal pulses.           Pulmonary/Chest: Breath sounds normal. No respiratory distress.   Musculoskeletal:         General: No tenderness or edema.      Cervical back: Full passive range of motion without pain, normal range of motion and neck supple.      Comments: Guarded rom d/t pain on ant shoulder     Lymphadenopathy:     She has no cervical adenopathy.   Neurological: She is alert and oriented to person, place, and time. She has normal strength. GCS score is 15. GCS eye subscore is 4. GCS verbal subscore is 5. GCS motor subscore is 6.   Skin: Skin is warm, dry and intact. Capillary refill takes less than 2 seconds.   Psychiatric: She has a normal mood and  affect. Her speech is normal and behavior is normal. Judgment normal. Cognition and memory are normal.       ED Course   Procedures  Labs Reviewed - No data to display       Imaging Results              X-Ray Shoulder Trauma Right (In process)                      Medications   ketorolac injection 30 mg (has no administration in time range)                       Medical Decision Making  Patient is a 43-year-old female presents emergency department with complaints of anterior right shoulder pain for 3 weeks.  She states the pain started out minimally and was able to take Motrin which controlled her but states it has worsened over the past 3 weeks.  She denies any recent or remote injury to the shoulder.  She states it is a tightness and sometimes sharp made worse with range of motion in alleviated some with rest.  She denies any other complaints or associated symptoms.    Problems Addressed:  Acute pain of right shoulder: acute illness or injury     Details: X-ray was done and there is no major abnormality.  With her having limited range of motion due to pain and even some popping I suspect that it could be a ligament injury of the shoulder but seems unlikely due to no recent or remote trauma to the shoulder.  Discussed symptomatic treatment with steroids and pain medication.  Discussed PCP follow-up in 3-4 days if no improvements with medication prescribed for possible PT or MRI referral.  Strict ED return precautions discussed for any changing worsening symptoms.  Patient was aware plan of care and had no questions or concerns prior to discharge.  Pain: acute illness or injury    Amount and/or Complexity of Data Reviewed  External Data Reviewed: labs, radiology and notes.  Radiology: ordered. Decision-making details documented in ED Course.            Clinical Impression:   Final diagnoses:  [R52] Pain  [M25.511] Acute pain of right shoulder (Primary)        ED Disposition Condition    Discharge Stable          ED  Prescriptions       Medication Sig Dispense Start Date End Date Auth. Provider    predniSONE (DELTASONE) 10 MG tablet Take 1 tablet (10 mg total) by mouth once daily. Take 4 tabs x 3 days, then take 2 tabs x 3 days, then take 1 tab x 3 days. 21 tablet 7/23/2023 -- HERNAN Marie    traMADoL (ULTRAM) 50 mg tablet Take 1 tablet (50 mg total) by mouth every 6 (six) hours as needed for Pain. 12 tablet 7/23/2023 -- HERNAN Marie          Follow-up Information       Follow up With Specialties Details Why Contact Info    [facility] Schedule an appointment as soon as possible for a visit  As needed, For ER Follow Up. [address]  [facility] [address]  [phone]               HERNAN Marie  07/23/23 8812

## 2023-08-18 PROBLEM — M25.511 ACUTE PAIN OF RIGHT SHOULDER: Status: ACTIVE | Noted: 2023-08-18

## 2023-09-14 ENCOUNTER — HOSPITAL ENCOUNTER (OUTPATIENT)
Dept: RADIOLOGY | Facility: HOSPITAL | Age: 43
Discharge: HOME | End: 2023-09-14
Attending: REGISTERED NURSE
Payer: MEDICAID

## 2023-09-14 DIAGNOSIS — M25.511 RIGHT SHOULDER PAIN, UNSPECIFIED CHRONICITY: ICD-10-CM

## 2023-09-14 PROCEDURE — 73221 MRI JOINT UPR EXTREM W/O DYE: CPT | Mod: TC,RT

## 2023-09-20 DIAGNOSIS — S43.431D TYPE 2 SUPERIOR LABRUM EXTENDING FROM ANTERIOR TO POSTERIOR (SLAP) LESION OF RIGHT SHOULDER, SUBSEQUENT ENCOUNTER: Primary | ICD-10-CM

## 2023-09-20 DIAGNOSIS — E03.9 HYPOTHYROIDISM, UNSPECIFIED TYPE: ICD-10-CM

## 2023-09-20 RX ORDER — LEVOTHYROXINE SODIUM 75 UG/1
75 TABLET ORAL DAILY
Qty: 90 TABLET | Refills: 0 | Status: SHIPPED | OUTPATIENT
Start: 2023-09-20

## 2023-09-20 NOTE — TELEPHONE ENCOUNTER
----- Message from EMILIA Davis sent at 9/20/2023  1:20 PM CDT -----  Hello,     I am going to put in a order for referral to ortho. Casie, and you call the patient with the results of the MRI and let her know we are going to send a referral to ortho for further treatment.

## 2023-09-20 NOTE — PROGRESS NOTES
Hello,     I am going to put in a order for referral to ortho. Casie, and you call the patient with the results of the MRI and let her know we are going to send a referral to ortho for further treatment.

## 2023-09-20 NOTE — TELEPHONE ENCOUNTER
Patient requesting refill when I called to give her MRI results. Please send thyroid refill to pharmacy on file.

## 2024-08-15 ENCOUNTER — ON-DEMAND VIRTUAL (OUTPATIENT)
Dept: URGENT CARE | Facility: CLINIC | Age: 44
End: 2024-08-15
Payer: MEDICAID

## 2024-08-15 DIAGNOSIS — R10.9 ABDOMINAL PAIN, UNSPECIFIED ABDOMINAL LOCATION: Primary | ICD-10-CM

## 2024-08-15 PROCEDURE — 99213 OFFICE O/P EST LOW 20 MIN: CPT | Mod: 95,,, | Performed by: NURSE PRACTITIONER

## 2024-08-15 RX ORDER — DICYCLOMINE HYDROCHLORIDE 10 MG/1
10 CAPSULE ORAL
Qty: 120 CAPSULE | Refills: 0 | Status: SHIPPED | OUTPATIENT
Start: 2024-08-15 | End: 2024-09-14

## 2024-08-15 NOTE — PROGRESS NOTES
Subjective:      Patient ID: Tatiana Salgado is a 44 y.o. female.    Vitals:  vitals were not taken for this visit.     Chief Complaint: Fever and Abdominal Cramping      Visit Type: TELE AUDIOVISUAL    Present with the patient at the time of consultation: TELEMED PRESENT WITH PATIENT: None        Past Medical History:   Diagnosis Date    Diverticulitis large intestine     DVT of upper extremity (deep vein thrombosis)     History of methicillin resistant staphylococcus aureus (MRSA)     Hypertension     Postoperative hypothyroidism      Past Surgical History:   Procedure Laterality Date    COLD KNIFE CONIZATION OF CERVIX      THYROIDECTOMY       Review of patient's allergies indicates:   Allergen Reactions    Morphine Rash     Current Outpatient Medications on File Prior to Visit   Medication Sig Dispense Refill    amLODIPine (NORVASC) 5 MG tablet Take 5 mg by mouth once daily.      levothyroxine (SYNTHROID) 75 MCG tablet Take 1 tablet (75 mcg total) by mouth once daily. 90 tablet 0    pantoprazole (PROTONIX) 20 MG tablet Take 1 tablet (20 mg total) by mouth once daily. 30 tablet 2    predniSONE (DELTASONE) 10 MG tablet Take 1 tablet (10 mg total) by mouth once daily. Take 4 tabs x 3 days, then take 2 tabs x 3 days, then take 1 tab x 3 days. 21 tablet 0     No current facility-administered medications on file prior to visit.     Family History   Problem Relation Name Age of Onset    Heart disease Mother      Hypertension Mother      No Known Problems Father         Medications Ordered                [id number] - [place] - [address] AT [facility] & Count includes the [facility] [address] [place] [address]    Telephone: [phone]   Fax: [phone]   Hours: Not open 24 hours                         E-Prescribed (1 of 1)              dicyclomine (BENTYL) 10 MG capsule    Sig: Take 1 capsule (10 mg total) by mouth 4 (four) times daily before meals and nightly.       Start: 8/15/24      Quantity: 120 capsule Refills: 0                           Ohs Peq Odvv Intake    8/15/2024  4:49 PM CDT - Filed by Patient   What is your current physical address in the event of a medical emergency? [address]   Are you able to take your vital signs? No   Please attach any relevant images or files          43 yo female with c/o abdominal pain and  fever. She states she has a hx of diverticultis and thinks she is having a flare. She denies diarrhea. She states positive nausea. She denies uri symptoms.          Constitution: Positive for fever.   HENT: Negative.     Cardiovascular: Negative.    Respiratory: Negative.     Gastrointestinal:  Positive for abdominal pain and diarrhea.   Endocrine: negative.   Genitourinary: Negative.  Negative for frequency and urgency.   Musculoskeletal: Negative.    Skin: Negative.    Allergic/Immunologic: Negative.    Neurological: Negative.    Hematologic/Lymphatic: Negative.    Psychiatric/Behavioral: Negative.          Objective:   The physical exam was conducted virtually.  LOCATION OF PATIENT home  Physical Exam   Constitutional: She is oriented to person, place, and time. She appears well-developed.   HENT:   Head: Normocephalic and atraumatic.   Ears:   Right Ear: Hearing, tympanic membrane and external ear normal.   Left Ear: Hearing, tympanic membrane and external ear normal.   Nose: Nose normal.   Mouth/Throat: Uvula is midline, oropharynx is clear and moist and mucous membranes are normal.   Eyes: Conjunctivae and EOM are normal. Pupils are equal, round, and reactive to light.   Neck: Neck supple.   Cardiovascular: Normal rate.   Pulmonary/Chest: Effort normal and breath sounds normal.   Musculoskeletal: Normal range of motion.         General: Normal range of motion.   Neurological: She is alert and oriented to person, place, and time.   Skin: Skin is warm.   Psychiatric: Her behavior is normal. Thought content normal.   Nursing note and vitals  reviewed.      Assessment:     1. Abdominal pain, unspecified abdominal location        Plan:   Follow up with your primary care provider if symptoms persist.  Go to the Emergency room for worsening of symptoms.       Abdominal pain, unspecified abdominal location  -     dicyclomine (BENTYL) 10 MG capsule; Take 1 capsule (10 mg total) by mouth 4 (four) times daily before meals and nightly.  Dispense: 120 capsule; Refill: 0

## 2024-09-24 ENCOUNTER — ON-DEMAND VIRTUAL (OUTPATIENT)
Dept: URGENT CARE | Facility: CLINIC | Age: 44
End: 2024-09-24
Payer: MEDICAID

## 2024-09-24 DIAGNOSIS — K21.9 GASTROESOPHAGEAL REFLUX DISEASE, UNSPECIFIED WHETHER ESOPHAGITIS PRESENT: ICD-10-CM

## 2024-09-24 DIAGNOSIS — Z76.0 MEDICATION REFILL: Primary | ICD-10-CM

## 2024-09-24 DIAGNOSIS — E03.9 HYPOTHYROIDISM, UNSPECIFIED TYPE: ICD-10-CM

## 2024-09-24 PROCEDURE — 99203 OFFICE O/P NEW LOW 30 MIN: CPT | Mod: 95,,, | Performed by: NURSE PRACTITIONER

## 2024-09-24 RX ORDER — LEVOTHYROXINE SODIUM 75 UG/1
75 TABLET ORAL DAILY
Qty: 30 TABLET | Refills: 0 | Status: SHIPPED | OUTPATIENT
Start: 2024-09-24 | End: 2024-10-24

## 2024-09-24 RX ORDER — AMLODIPINE BESYLATE 5 MG/1
5 TABLET ORAL DAILY
Qty: 30 TABLET | Refills: 0 | Status: SHIPPED | OUTPATIENT
Start: 2024-09-24 | End: 2024-10-24

## 2024-09-24 RX ORDER — PANTOPRAZOLE SODIUM 20 MG/1
20 TABLET, DELAYED RELEASE ORAL DAILY
Qty: 30 TABLET | Refills: 0 | Status: SHIPPED | OUTPATIENT
Start: 2024-09-24 | End: 2024-10-24

## 2024-09-24 NOTE — PROGRESS NOTES
Subjective:      Patient ID: Tatiana Salgado is a 44 y.o. female.    Vitals:  vitals were not taken for this visit.     Chief Complaint: Medication Refill      Visit Type: TELE AUDIOVISUAL    Present with the patient at the time of consultation: TELEMED PRESENT WITH PATIENT: None, at home    Past Medical History:   Diagnosis Date    Diverticulitis large intestine     DVT of upper extremity (deep vein thrombosis)     History of methicillin resistant staphylococcus aureus (MRSA)     Hypertension     Postoperative hypothyroidism      Past Surgical History:   Procedure Laterality Date    COLD KNIFE CONIZATION OF CERVIX      THYROIDECTOMY       Review of patient's allergies indicates:   Allergen Reactions    Morphine Rash     Current Outpatient Medications on File Prior to Visit   Medication Sig Dispense Refill    predniSONE (DELTASONE) 10 MG tablet Take 1 tablet (10 mg total) by mouth once daily. Take 4 tabs x 3 days, then take 2 tabs x 3 days, then take 1 tab x 3 days. 21 tablet 0    [DISCONTINUED] amLODIPine (NORVASC) 5 MG tablet Take 5 mg by mouth once daily.      [DISCONTINUED] levothyroxine (SYNTHROID) 75 MCG tablet Take 1 tablet (75 mcg total) by mouth once daily. 90 tablet 0    [DISCONTINUED] pantoprazole (PROTONIX) 20 MG tablet Take 1 tablet (20 mg total) by mouth once daily. 30 tablet 2     No current facility-administered medications on file prior to visit.     Family History   Problem Relation Name Age of Onset    Heart disease Mother      Hypertension Mother      No Known Problems Father         Medications Ordered                [id number] - [place], [place] - [address] AT [facility] & [facility] [address] [place] [address]    Telephone: [phone]   Fax: [phone]   Hours: Not open 24 hours                         E-Prescribed (3 of 3)              amLODIPine (NORVASC) 5 MG tablet    Sig: Take 1 tablet (5 mg total) by mouth once daily.       Start: 9/24/24      Quantity: 30 tablet Refills: 0                         levothyroxine (SYNTHROID) 75 MCG tablet    Sig: Take 1 tablet (75 mcg total) by mouth once daily.       Start: 9/24/24     Quantity: 30 tablet Refills: 0                         pantoprazole (PROTONIX) 20 MG tablet    Sig: Take 1 tablet (20 mg total) by mouth once daily.       Start: 9/24/24     Quantity: 30 tablet Refills: 0                           Ohs Peq Odvv Intake    9/24/2024  9:16 AM CDT - Filed by Patient   What is your current physical address in the event of a medical emergency? [address]   Are you able to take your vital signs? No   Please attach any relevant images or files          Out of synthroid, GERD, and BP meds. PCP f/u coming up. No acute issues or concerns. Needs refills only.     Medication Refill  Pertinent negatives include no abdominal pain, chest pain, chills, fever, nausea or vomiting.       Constitution: Negative for chills and fever.   Cardiovascular:  Negative for chest pain, leg swelling, palpitations and sob on exertion.   Gastrointestinal:  Positive for heartburn. Negative for abdominal pain, nausea and vomiting.   Endocrine: hair loss, cold intolerance, heat intolerance, excessive thirst and excessive urination.        Objective:   The physical exam was conducted virtually.  Physical Exam   Constitutional: She is oriented to person, place, and time. She does not appear ill. No distress.   HENT:   Head: Normocephalic and atraumatic.   Nose: Nose normal.   Eyes: Extraocular movement intact   Pulmonary/Chest: Effort normal.   Abdominal: Normal appearance.   Musculoskeletal: Normal range of motion.         General: Normal range of motion.   Neurological: no focal deficit. She is alert and oriented to person, place, and time.   Psychiatric: Her behavior is normal. Mood normal.   Vitals reviewed.      Assessment:     1. Medication refill    2. Hypothyroidism, unspecified type    3. Gastroesophageal reflux disease, unspecified  whether esophagitis present        Plan:   Follow-up as scheduled.    Patient encouraged to monitor symptoms closely and instructed to follow-up for new or worsening symptoms. Further, in-person, evaluation may be necessary for continued treatment. Please follow up with your primary care doctor or specialist as needed. Verbally discussed plan. Patient confirms understanding and is in agreement with treatment and plan.     You must understand that you've received a Virtual Care evaluation only and that you may be released before all your medical problems are known or treated. You, the patient, will arrange for follow up care as instructed.      Medication refill  -     amLODIPine (NORVASC) 5 MG tablet; Take 1 tablet (5 mg total) by mouth once daily.  Dispense: 30 tablet; Refill: 0  -     levothyroxine (SYNTHROID) 75 MCG tablet; Take 1 tablet (75 mcg total) by mouth once daily.  Dispense: 30 tablet; Refill: 0  -     pantoprazole (PROTONIX) 20 MG tablet; Take 1 tablet (20 mg total) by mouth once daily.  Dispense: 30 tablet; Refill: 0    Hypothyroidism, unspecified type  -     levothyroxine (SYNTHROID) 75 MCG tablet; Take 1 tablet (75 mcg total) by mouth once daily.  Dispense: 30 tablet; Refill: 0    Gastroesophageal reflux disease, unspecified whether esophagitis present  -     pantoprazole (PROTONIX) 20 MG tablet; Take 1 tablet (20 mg total) by mouth once daily.  Dispense: 30 tablet; Refill: 0

## 2024-11-04 ENCOUNTER — ON-DEMAND VIRTUAL (OUTPATIENT)
Dept: URGENT CARE | Facility: CLINIC | Age: 44
End: 2024-11-04
Payer: MEDICAID

## 2024-12-18 ENCOUNTER — HOSPITAL ENCOUNTER (EMERGENCY)
Facility: HOSPITAL | Age: 44
Discharge: HOME | End: 2024-12-18
Attending: INTERNAL MEDICINE
Payer: MEDICAID

## 2024-12-18 VITALS
HEIGHT: 65 IN | DIASTOLIC BLOOD PRESSURE: 78 MMHG | BODY MASS INDEX: 32.32 KG/M2 | TEMPERATURE: 99 F | RESPIRATION RATE: 19 BRPM | HEART RATE: 69 BPM | WEIGHT: 194 LBS | OXYGEN SATURATION: 100 % | SYSTOLIC BLOOD PRESSURE: 137 MMHG

## 2024-12-18 DIAGNOSIS — R10.11 RUQ ABDOMINAL PAIN: Primary | ICD-10-CM

## 2024-12-18 DIAGNOSIS — R10.13 EPIGASTRIC ABDOMINAL PAIN: ICD-10-CM

## 2024-12-18 LAB
ALBUMIN SERPL-MCNC: 3.4 G/DL (ref 3.5–5)
ALBUMIN/GLOB SERPL: 0.6 RATIO (ref 1.1–2)
ALP SERPL-CCNC: 47 UNIT/L (ref 40–150)
ALT SERPL-CCNC: 11 UNIT/L (ref 0–55)
ANION GAP SERPL CALC-SCNC: 8 MEQ/L
AST SERPL-CCNC: 16 UNIT/L (ref 5–34)
BASOPHILS # BLD AUTO: 0.07 X10(3)/MCL
BASOPHILS NFR BLD AUTO: 0.8 %
BILIRUB SERPL-MCNC: 0.4 MG/DL
BUN SERPL-MCNC: 10 MG/DL (ref 7–18.7)
CALCIUM SERPL-MCNC: 10.8 MG/DL (ref 8.4–10.2)
CHLORIDE SERPL-SCNC: 106 MMOL/L (ref 98–107)
CO2 SERPL-SCNC: 25 MMOL/L (ref 22–29)
CREAT SERPL-MCNC: 0.84 MG/DL (ref 0.55–1.02)
CREAT/UREA NIT SERPL: 12
EOSINOPHIL # BLD AUTO: 0.07 X10(3)/MCL (ref 0–0.9)
EOSINOPHIL NFR BLD AUTO: 0.8 %
ERYTHROCYTE [DISTWIDTH] IN BLOOD BY AUTOMATED COUNT: 13.3 % (ref 11.5–17)
GFR SERPLBLD CREATININE-BSD FMLA CKD-EPI: >60 ML/MIN/1.73/M2
GLOBULIN SER-MCNC: 5.5 GM/DL (ref 2.4–3.5)
GLUCOSE SERPL-MCNC: 113 MG/DL (ref 74–100)
HCT VFR BLD AUTO: 37.9 % (ref 37–47)
HGB BLD-MCNC: 12.3 G/DL (ref 12–16)
IMM GRANULOCYTES # BLD AUTO: 0.01 X10(3)/MCL (ref 0–0.04)
IMM GRANULOCYTES NFR BLD AUTO: 0.1 %
LIPASE SERPL-CCNC: 28 U/L
LYMPHOCYTES # BLD AUTO: 3.61 X10(3)/MCL (ref 0.6–4.6)
LYMPHOCYTES NFR BLD AUTO: 43.7 %
MCH RBC QN AUTO: 29.2 PG (ref 27–31)
MCHC RBC AUTO-ENTMCNC: 32.5 G/DL (ref 33–36)
MCV RBC AUTO: 90 FL (ref 80–94)
MONOCYTES # BLD AUTO: 0.7 X10(3)/MCL (ref 0.1–1.3)
MONOCYTES NFR BLD AUTO: 8.5 %
NEUTROPHILS # BLD AUTO: 3.8 X10(3)/MCL (ref 2.1–9.2)
NEUTROPHILS NFR BLD AUTO: 46.1 %
PLATELET # BLD AUTO: 235 X10(3)/MCL (ref 130–400)
PMV BLD AUTO: 10.2 FL (ref 7.4–10.4)
POTASSIUM SERPL-SCNC: 3.5 MMOL/L (ref 3.5–5.1)
PROT SERPL-MCNC: 8.9 GM/DL (ref 6.4–8.3)
RBC # BLD AUTO: 4.21 X10(6)/MCL (ref 4.2–5.4)
SODIUM SERPL-SCNC: 139 MMOL/L (ref 136–145)
WBC # BLD AUTO: 8.26 X10(3)/MCL (ref 4.5–11.5)

## 2024-12-18 PROCEDURE — 99284 EMERGENCY DEPT VISIT MOD MDM: CPT

## 2024-12-18 PROCEDURE — 83690 ASSAY OF LIPASE: CPT | Performed by: INTERNAL MEDICINE

## 2024-12-18 PROCEDURE — 80053 COMPREHEN METABOLIC PANEL: CPT | Performed by: INTERNAL MEDICINE

## 2024-12-18 PROCEDURE — 85025 COMPLETE CBC W/AUTO DIFF WBC: CPT | Performed by: INTERNAL MEDICINE

## 2024-12-18 PROCEDURE — 25000003 PHARM REV CODE 250: Performed by: INTERNAL MEDICINE

## 2024-12-18 RX ORDER — FAMOTIDINE 20 MG/1
20 TABLET, FILM COATED ORAL
Status: COMPLETED | OUTPATIENT
Start: 2024-12-18 | End: 2024-12-18

## 2024-12-18 RX ORDER — ONDANSETRON 4 MG/1
4 TABLET, ORALLY DISINTEGRATING ORAL
Status: DISCONTINUED | OUTPATIENT
Start: 2024-12-18 | End: 2024-12-18 | Stop reason: HOSPADM

## 2024-12-18 RX ORDER — TRAMADOL HYDROCHLORIDE 50 MG/1
50 TABLET ORAL EVERY 6 HOURS PRN
Qty: 12 TABLET | Refills: 0 | Status: SHIPPED | OUTPATIENT
Start: 2024-12-18 | End: 2024-12-19

## 2024-12-18 RX ORDER — SUCRALFATE 1 G/1
1 TABLET ORAL 4 TIMES DAILY PRN
Qty: 30 TABLET | Refills: 0 | Status: SHIPPED | OUTPATIENT
Start: 2024-12-18

## 2024-12-18 RX ORDER — LIDOCAINE HYDROCHLORIDE 20 MG/ML
15 SOLUTION OROPHARYNGEAL ONCE
Status: COMPLETED | OUTPATIENT
Start: 2024-12-18 | End: 2024-12-18

## 2024-12-18 RX ORDER — ALUMINUM HYDROXIDE, MAGNESIUM HYDROXIDE, AND SIMETHICONE 1200; 120; 1200 MG/30ML; MG/30ML; MG/30ML
30 SUSPENSION ORAL ONCE
Status: COMPLETED | OUTPATIENT
Start: 2024-12-18 | End: 2024-12-18

## 2024-12-18 RX ORDER — OMEPRAZOLE 20 MG/1
40 CAPSULE, DELAYED RELEASE ORAL DAILY
Qty: 30 CAPSULE | Refills: 0 | Status: SHIPPED | OUTPATIENT
Start: 2024-12-18 | End: 2024-12-19

## 2024-12-18 RX ADMIN — LIDOCAINE HYDROCHLORIDE 15 ML: 20 SOLUTION ORAL at 12:12

## 2024-12-18 RX ADMIN — FAMOTIDINE 20 MG: 20 TABLET, FILM COATED ORAL at 01:12

## 2024-12-18 RX ADMIN — ALUMINUM HYDROXIDE, MAGNESIUM HYDROXIDE, AND SIMETHICONE 30 ML: 200; 200; 20 SUSPENSION ORAL at 12:12

## 2024-12-18 NOTE — ED PROVIDER NOTES
Encounter Date: 12/18/2024       History     Chief Complaint   Patient presents with    Abdominal Pain     Pt c/o abd pain starting yesterday. Pt states she has a hx of diverticulitis and acid reflux.     44-year-old female with a past medical history of diverticulitis, hypertension and DVT presenting with abdominal pain.  Patient reports she had a flare-up recently of her diverticulitis, she has been taking Augmentin with this and not missing any doses.  Her pain from her diverticulitis is in the left lower quadrant and is nearly resolved.  She noticed today a pain in the right upper quadrant and epigastric region.  She states the pain is mostly in the right upper quadrant but will occasionally move to the epigastric region.  She does have a history of GERD, and believes this may be either GERD or an ulcer.  Other than being started on Augmentin, there are no other changes in medications.  There is associated nausea, however this is chronic and unchanged.  She denies fevers, chills, vomiting, chest pain, shortness of breath, back pain, diarrhea, constipation, urinary changes, dizziness, lightheadedness, cough, rhinorrhea, sore throat.    The history is provided by the patient.     Review of patient's allergies indicates:   Allergen Reactions    Morphine Rash     Past Medical History:   Diagnosis Date    Diverticulitis large intestine     DVT of upper extremity (deep vein thrombosis)     History of methicillin resistant staphylococcus aureus (MRSA)     Hypertension     Postoperative hypothyroidism      Past Surgical History:   Procedure Laterality Date    COLD KNIFE CONIZATION OF CERVIX      THYROIDECTOMY       Family History   Problem Relation Name Age of Onset    Heart disease Mother      Hypertension Mother      No Known Problems Father       Social History     Tobacco Use    Smoking status: Never    Smokeless tobacco: Never   Substance Use Topics    Alcohol use: Not Currently    Drug use: Never     Review of  Systems   All other systems reviewed and are negative.      Physical Exam     Initial Vitals [12/18/24 0017]   BP Pulse Resp Temp SpO2   (!) 154/78 73 18 98.3 °F (36.8 °C) 100 %      MAP       --         Physical Exam    Constitutional: She appears well-developed and well-nourished. She is Obese . She is cooperative.  Non-toxic appearance. She does not appear ill.   HENT:   Head: Normocephalic and atraumatic. Mouth/Throat: Uvula is midline, oropharynx is clear and moist and mucous membranes are normal.   Eyes: Conjunctivae and EOM are normal. Pupils are equal, round, and reactive to light.   Neck: Trachea normal and phonation normal. Neck supple.    Full passive range of motion without pain.     Cardiovascular:  Normal rate, regular rhythm, normal heart sounds, intact distal pulses and normal pulses.           No murmur heard.  Pulmonary/Chest: No accessory muscle usage. No tachypnea. No respiratory distress. She has no decreased breath sounds. She has no wheezes. She has no rhonchi. She has no rales.   Abdominal: Abdomen is soft and protuberant. Bowel sounds are normal. She exhibits no distension. There is no splenomegaly. There is abdominal tenderness in the right upper quadrant and epigastric area. Hernia confirmed negative in the umbilical area and confirmed negative in the ventral area.   No right CVA tenderness.  No left CVA tenderness. There is no rebound, no guarding, no tenderness at McBurney's point and negative Lewis's sign. negative Rovsing's sign  Musculoskeletal:      Cervical back: Full passive range of motion without pain and neck supple.     Neurological: She is alert and oriented to person, place, and time. GCS eye subscore is 4. GCS verbal subscore is 5. GCS motor subscore is 6.   Skin: Skin is warm, dry and intact. Capillary refill takes less than 2 seconds. No rash noted.   Psychiatric: She has a normal mood and affect. Her speech is normal and behavior is normal. Thought content normal.          ED Course   Procedures  Labs Reviewed   COMPREHENSIVE METABOLIC PANEL - Abnormal       Result Value    Sodium 139      Potassium 3.5      Chloride 106      CO2 25      Glucose 113 (*)     Blood Urea Nitrogen 10.0      Creatinine 0.84      Calcium 10.8 (*)     Protein Total 8.9 (*)     Albumin 3.4 (*)     Globulin 5.5 (*)     Albumin/Globulin Ratio 0.6 (*)     Bilirubin Total 0.4      ALP 47      ALT 11      AST 16      eGFR >60      Anion Gap 8.0      BUN/Creatinine Ratio 12     CBC WITH DIFFERENTIAL - Abnormal    WBC 8.26      RBC 4.21      Hgb 12.3      Hct 37.9      MCV 90.0      MCH 29.2      MCHC 32.5 (*)     RDW 13.3      Platelet 235      MPV 10.2      Neut % 46.1      Lymph % 43.7      Mono % 8.5      Eos % 0.8      Basophil % 0.8      Lymph # 3.61      Neut # 3.80      Mono # 0.70      Eos # 0.07      Baso # 0.07      IG# 0.01      IG% 0.1     LIPASE - Normal    Lipase Level 28     CBC W/ AUTO DIFFERENTIAL    Narrative:     The following orders were created for panel order CBC auto differential.  Procedure                               Abnormality         Status                     ---------                               -----------         ------                     CBC with Differential[[id number]]        Abnormal            Final result                 Please view results for these tests on the individual orders.          Imaging Results    None          Medications   ondansetron disintegrating tablet 4 mg (4 mg Oral Not Given 12/18/24 0030)   aluminum-magnesium hydroxide-simethicone 200-200-20 mg/5 mL suspension 30 mL (30 mLs Oral Given 12/18/24 0043)     And   LIDOcaine viscous HCl 2% oral solution 15 mL (15 mLs Oral Given 12/18/24 0043)   famotidine tablet 20 mg (20 mg Oral Given 12/18/24 0113)     Medical Decision Making  Differential diagnosis includes GERD, gastritis, peptic ulcer, cholelithiasis, cholecystitis, hepatitis, pancreatitis, choledocholithiasis    44-year-old female presenting  with right upper quadrant and epigastric abdominal pain.  Vital signs stable, afebrile.    Labs are without leukocytosis, anemia, abnormal electrolytes, abnormal renal function, elevated lipase, elevated LFTs.  Patient was given Pepcid and GI cocktail with some improvement of pain.  Discussed with patient's symptoms likely peptic ulcer versus gastritis versus GERD.  Patient is stable for discharge home but will require further GI follow-up for continued evaluation and likely EGD.  We will prescribe Norco for severe pain, Prilosec and sucralfate.  Return precautions given.  Patient verbalized understanding of the plan and agreed.    Problems Addressed:  Epigastric abdominal pain: undiagnosed new problem with uncertain prognosis  RUQ abdominal pain: undiagnosed new problem with uncertain prognosis    Amount and/or Complexity of Data Reviewed  External Data Reviewed: labs and notes.  Labs: ordered. Decision-making details documented in ED Course.    Risk  OTC drugs.  Prescription drug management.                                      Clinical Impression:  Final diagnoses:  [R10.11] RUQ abdominal pain (Primary)  [R10.13] Epigastric abdominal pain          ED Disposition Condition    Discharge Stable          ED Prescriptions       Medication Sig Dispense Start Date End Date Auth. Provider    sucralfate (CARAFATE) 1 gram tablet Take 1 tablet (1 g total) by mouth 4 (four) times daily as needed (upper abdominal pain). 30 tablet 12/18/2024 -- Migue Pena DO    traMADoL (ULTRAM) 50 mg tablet Take 1 tablet (50 mg total) by mouth every 6 (six) hours as needed (severe pain). 12 tablet 12/18/2024 -- Migue Pena DO    omeprazole (PRILOSEC) 20 MG capsule Take 2 capsules (40 mg total) by mouth once daily. 30 capsule 12/18/2024 -- Migue Pena DO          Follow-up Information       Follow up With Specialties Details Why Contact Info    [facility] Gastroenterology Schedule an  appointment as soon as possible for a visit  For emergency department follow up [id number]  [facility] [id number]    [facility]  Call today If you do not have a PCP for follow-up [phone], call to find a doctor             Migue Pnea, DO  12/18/24 4760

## 2024-12-18 NOTE — Clinical Note
"Tatiana Graham"Naomi was seen and treated in our emergency department on 12/18/2024.  She may return to work on 12/19/2024.       If you have any questions or concerns, please don't hesitate to call.      Migue Pena, DO"

## 2024-12-19 ENCOUNTER — LAB VISIT (OUTPATIENT)
Dept: LAB | Facility: HOSPITAL | Age: 44
End: 2024-12-19
Attending: NURSE PRACTITIONER
Payer: MEDICAID

## 2024-12-19 ENCOUNTER — OFFICE VISIT (OUTPATIENT)
Dept: FAMILY MEDICINE | Facility: CLINIC | Age: 44
End: 2024-12-19
Payer: MEDICAID

## 2024-12-19 VITALS
HEART RATE: 67 BPM | RESPIRATION RATE: 18 BRPM | OXYGEN SATURATION: 95 % | SYSTOLIC BLOOD PRESSURE: 136 MMHG | BODY MASS INDEX: 31.99 KG/M2 | HEIGHT: 65 IN | TEMPERATURE: 97 F | WEIGHT: 192 LBS | DIASTOLIC BLOOD PRESSURE: 88 MMHG

## 2024-12-19 DIAGNOSIS — E03.9 HYPOTHYROIDISM, UNSPECIFIED TYPE: Chronic | ICD-10-CM

## 2024-12-19 DIAGNOSIS — Z11.4 ENCOUNTER FOR SCREENING FOR HIV: ICD-10-CM

## 2024-12-19 DIAGNOSIS — Z12.31 BREAST CANCER SCREENING BY MAMMOGRAM: ICD-10-CM

## 2024-12-19 DIAGNOSIS — R10.13 EPIGASTRIC PAIN: ICD-10-CM

## 2024-12-19 DIAGNOSIS — Z12.4 CERVICAL CANCER SCREENING: ICD-10-CM

## 2024-12-19 DIAGNOSIS — Z11.59 NEED FOR HEPATITIS C SCREENING TEST: ICD-10-CM

## 2024-12-19 DIAGNOSIS — K21.9 GASTROESOPHAGEAL REFLUX DISEASE, UNSPECIFIED WHETHER ESOPHAGITIS PRESENT: ICD-10-CM

## 2024-12-19 DIAGNOSIS — Z87.11 HISTORY OF PEPTIC ULCER DISEASE: ICD-10-CM

## 2024-12-19 DIAGNOSIS — K57.30 DIVERTICULOSIS OF COLON: ICD-10-CM

## 2024-12-19 DIAGNOSIS — E04.2 MULTIPLE THYROID NODULES: ICD-10-CM

## 2024-12-19 DIAGNOSIS — I10 HYPERTENSION, UNSPECIFIED TYPE: ICD-10-CM

## 2024-12-19 DIAGNOSIS — R10.13 EPIGASTRIC ABDOMINAL PAIN: ICD-10-CM

## 2024-12-19 DIAGNOSIS — I10 HYPERTENSION, UNSPECIFIED TYPE: Chronic | ICD-10-CM

## 2024-12-19 DIAGNOSIS — Z76.89 ENCOUNTER TO ESTABLISH CARE: Primary | ICD-10-CM

## 2024-12-19 LAB
25(OH)D3+25(OH)D2 SERPL-MCNC: 42 NG/ML (ref 30–80)
ALBUMIN SERPL-MCNC: 3.5 G/DL (ref 3.5–5)
ALBUMIN/GLOB SERPL: 0.6 RATIO (ref 1.1–2)
ALP SERPL-CCNC: 41 UNIT/L (ref 40–150)
ALT SERPL-CCNC: 10 UNIT/L (ref 0–55)
ANION GAP SERPL CALC-SCNC: 8 MEQ/L
AST SERPL-CCNC: 15 UNIT/L (ref 5–34)
BACTERIA #/AREA URNS AUTO: ABNORMAL /HPF
BASOPHILS # BLD AUTO: 0.06 X10(3)/MCL
BASOPHILS NFR BLD AUTO: 0.9 %
BILIRUB SERPL-MCNC: 0.8 MG/DL
BILIRUB UR QL STRIP.AUTO: ABNORMAL
BUN SERPL-MCNC: 12 MG/DL (ref 7–18.7)
CALCIUM SERPL-MCNC: 10.3 MG/DL (ref 8.4–10.2)
CHLORIDE SERPL-SCNC: 106 MMOL/L (ref 98–107)
CHOLEST SERPL-MCNC: 174 MG/DL
CHOLEST/HDLC SERPL: 4 {RATIO} (ref 0–5)
CLARITY UR: ABNORMAL
CO2 SERPL-SCNC: 25 MMOL/L (ref 22–29)
COLOR UR AUTO: YELLOW
CREAT SERPL-MCNC: 0.97 MG/DL (ref 0.55–1.02)
CREAT/UREA NIT SERPL: 12
EOSINOPHIL # BLD AUTO: 0.05 X10(3)/MCL (ref 0–0.9)
EOSINOPHIL NFR BLD AUTO: 0.7 %
ERYTHROCYTE [DISTWIDTH] IN BLOOD BY AUTOMATED COUNT: 13.3 % (ref 11.5–17)
EST. AVERAGE GLUCOSE BLD GHB EST-MCNC: 108.3 MG/DL
GFR SERPLBLD CREATININE-BSD FMLA CKD-EPI: >60 ML/MIN/1.73/M2
GLOBULIN SER-MCNC: 5.5 GM/DL (ref 2.4–3.5)
GLUCOSE SERPL-MCNC: 96 MG/DL (ref 74–100)
GLUCOSE UR QL STRIP: NEGATIVE
H PYLORI INDEX VALUE: NEGATIVE
HBA1C MFR BLD: 5.4 %
HCT VFR BLD AUTO: 39.3 % (ref 37–47)
HCV AB SERPL QL IA: NONREACTIVE
HDLC SERPL-MCNC: 42 MG/DL (ref 35–60)
HGB BLD-MCNC: 12.6 G/DL (ref 12–16)
HGB UR QL STRIP: ABNORMAL
HIV 1+2 AB+HIV1 P24 AG SERPL QL IA: NONREACTIVE
IMM GRANULOCYTES # BLD AUTO: 0.01 X10(3)/MCL (ref 0–0.04)
IMM GRANULOCYTES NFR BLD AUTO: 0.1 %
KETONES UR QL STRIP: NEGATIVE
LDLC SERPL CALC-MCNC: 119 MG/DL (ref 50–140)
LEUKOCYTE ESTERASE UR QL STRIP: NEGATIVE
LYMPHOCYTES # BLD AUTO: 2.94 X10(3)/MCL (ref 0.6–4.6)
LYMPHOCYTES NFR BLD AUTO: 43.4 %
MCH RBC QN AUTO: 28.8 PG (ref 27–31)
MCHC RBC AUTO-ENTMCNC: 32.1 G/DL (ref 33–36)
MCV RBC AUTO: 89.9 FL (ref 80–94)
MONOCYTES # BLD AUTO: 0.45 X10(3)/MCL (ref 0.1–1.3)
MONOCYTES NFR BLD AUTO: 6.6 %
MUCOUS THREADS URNS QL MICRO: ABNORMAL /LPF
NEUTROPHILS # BLD AUTO: 3.26 X10(3)/MCL (ref 2.1–9.2)
NEUTROPHILS NFR BLD AUTO: 48.3 %
NITRITE UR QL STRIP: NEGATIVE
PH UR STRIP: 5.5 [PH]
PLATELET # BLD AUTO: 250 X10(3)/MCL (ref 130–400)
PMV BLD AUTO: 10.3 FL (ref 7.4–10.4)
POTASSIUM SERPL-SCNC: 3.8 MMOL/L (ref 3.5–5.1)
PROT SERPL-MCNC: 9 GM/DL (ref 6.4–8.3)
PROT UR QL STRIP: ABNORMAL
RBC # BLD AUTO: 4.37 X10(6)/MCL (ref 4.2–5.4)
RBC #/AREA URNS AUTO: ABNORMAL /HPF
SODIUM SERPL-SCNC: 139 MMOL/L (ref 136–145)
SP GR UR STRIP.AUTO: >=1.03 (ref 1–1.03)
SQUAMOUS #/AREA URNS AUTO: ABNORMAL /HPF
T3FREE SERPL-MCNC: 2.31 PG/ML (ref 1.58–3.91)
T4 FREE SERPL-MCNC: 1.08 NG/DL (ref 0.7–1.48)
TRIGL SERPL-MCNC: 65 MG/DL (ref 37–140)
TSH SERPL-ACNC: 1.94 UIU/ML (ref 0.35–4.94)
UROBILINOGEN UR STRIP-ACNC: 0.2
VLDLC SERPL CALC-MCNC: 13 MG/DL
WBC # BLD AUTO: 6.77 X10(3)/MCL (ref 4.5–11.5)
WBC #/AREA URNS AUTO: ABNORMAL /HPF

## 2024-12-19 PROCEDURE — 83036 HEMOGLOBIN GLYCOSYLATED A1C: CPT

## 2024-12-19 PROCEDURE — 80061 LIPID PANEL: CPT

## 2024-12-19 PROCEDURE — 85025 COMPLETE CBC W/AUTO DIFF WBC: CPT

## 2024-12-19 PROCEDURE — 82306 VITAMIN D 25 HYDROXY: CPT

## 2024-12-19 PROCEDURE — 84443 ASSAY THYROID STIM HORMONE: CPT

## 2024-12-19 PROCEDURE — 84439 ASSAY OF FREE THYROXINE: CPT

## 2024-12-19 PROCEDURE — 99999 PR PBB SHADOW E&M-EST. PATIENT-LVL V: CPT | Mod: PBBFAC,,, | Performed by: NURSE PRACTITIONER

## 2024-12-19 PROCEDURE — 80053 COMPREHEN METABOLIC PANEL: CPT

## 2024-12-19 PROCEDURE — 81003 URINALYSIS AUTO W/O SCOPE: CPT

## 2024-12-19 PROCEDURE — 99215 OFFICE O/P EST HI 40 MIN: CPT | Mod: PBBFAC | Performed by: NURSE PRACTITIONER

## 2024-12-19 PROCEDURE — 36415 COLL VENOUS BLD VENIPUNCTURE: CPT

## 2024-12-19 PROCEDURE — 84481 FREE ASSAY (FT-3): CPT

## 2024-12-19 PROCEDURE — 86803 HEPATITIS C AB TEST: CPT

## 2024-12-19 PROCEDURE — 87389 HIV-1 AG W/HIV-1&-2 AB AG IA: CPT

## 2024-12-19 PROCEDURE — 99999PBSHW POCT H. PYLORI: Mod: PBBFAC,,,

## 2024-12-19 RX ORDER — PANTOPRAZOLE SODIUM 40 MG/1
40 TABLET, DELAYED RELEASE ORAL DAILY
COMMUNITY

## 2024-12-19 RX ORDER — HYDROCHLOROTHIAZIDE 12.5 MG/1
12.5 TABLET ORAL DAILY
COMMUNITY
Start: 2024-12-13

## 2024-12-19 RX ORDER — AMLODIPINE BESYLATE 10 MG/1
10 TABLET ORAL DAILY
COMMUNITY

## 2024-12-19 NOTE — PROGRESS NOTES
Family Medicine    Patient ID: [id number]     Chief Complaint: Establish Care and Hospital Follow Up      HPI:     Tatiana Salgado is here today for establish care, patient does note having epigastric pain and discomfort, she also has a history of diverticulitis and diverticulosis with a recent visit to the emergency room.    Past Medical History:   Diagnosis Date    Anxiety     Diverticulitis large intestine     DVT of upper extremity (deep vein thrombosis) 2017    r/t spider bite    History of methicillin resistant staphylococcus aureus (MRSA)     Hypertension     Postoperative hypothyroidism         Past Surgical History:   Procedure Laterality Date    COLD KNIFE CONIZATION OF CERVIX      THYROIDECTOMY          Social History     Tobacco Use    Smoking status: Never    Smokeless tobacco: Never   Substance and Sexual Activity    Alcohol use: Not Currently    Drug use: Never    Sexual activity: Yes     Partners: Male     Birth control/protection: None        Current Outpatient Medications   Medication Instructions    amLODIPine (NORVASC) 10 mg, Daily    hydroCHLOROthiazide (HYDRODIURIL) 12.5 mg, Daily    levothyroxine (SYNTHROID) 75 mcg, Oral, Daily    pantoprazole (PROTONIX) 40 mg, Daily    sucralfate (CARAFATE) 1 g, Oral, 4 times daily PRN       Review of patient's allergies indicates:   Allergen Reactions    Morphine Rash        Patient Care Team:  Sara Estrada FNP as PCP - General (Family Medicine)  Anthony Stafford MD as Consulting Physician (Cardiology)     Subjective:     Review of Systems   Constitutional:  Negative for appetite change, chills, diaphoresis and fever.   HENT:  Negative for ear pain, sinus pain and sore throat.    Eyes:  Negative for pain and visual disturbance.   Respiratory:  Negative for cough, shortness of breath and wheezing.    Cardiovascular:  Negative for chest pain, palpitations and leg swelling.   Gastrointestinal:  Negative for abdominal pain, blood in stool, diarrhea, nausea and  12/19/2024 10:51 AM     REFILL REQUEST:     This is a patient of mine. PDMP and Chart have been reviewed; refill request is appropriate.    If Norco 7.5/325mg is effective, then keep at this dose for awhile.     Refilled for 15 day supply.  Script e-Prescribed to pharmacy    PACO Mansfield, ETHAN, FNP-C    "vomiting.   Endocrine: Negative for cold intolerance.   Genitourinary:  Negative for difficulty urinating, dysuria, frequency and hematuria.   Musculoskeletal:  Negative for arthralgias, joint swelling and myalgias.   Skin:  Negative for color change and rash.   Allergic/Immunologic: Negative.    Neurological: Negative.  Negative for dizziness, syncope, light-headedness and numbness.   Hematological: Negative.    Psychiatric/Behavioral: Negative.  Negative for dysphoric mood and suicidal ideas. The patient is not nervous/anxious.    All other systems reviewed and are negative.      12 point review of systems conducted, negative except as stated in the history of present illness. See HPI for details.    Objective:     Visit Vitals  /88   Pulse 67   Temp 97.3 °F (36.3 °C) (Temporal)   Resp 18   Ht 5' 5" (1.651 m)   Wt 87.1 kg (192 lb)   LMP 12/17/2024 (Exact Date)   SpO2 95%   BMI 31.95 kg/m²       Physical Exam  Vitals and nursing note reviewed.   Constitutional:       General: She is not in acute distress.     Appearance: She is not ill-appearing.   HENT:      Head: Normocephalic and atraumatic.      Mouth/Throat:      Mouth: Mucous membranes are moist.      Pharynx: Oropharynx is clear.   Eyes:      General: No scleral icterus.     Extraocular Movements: Extraocular movements intact.      Conjunctiva/sclera: Conjunctivae normal.      Pupils: Pupils are equal, round, and reactive to light.   Neck:      Vascular: No carotid bruit.   Cardiovascular:      Rate and Rhythm: Normal rate and regular rhythm.      Heart sounds: No murmur heard.     No friction rub. No gallop.   Pulmonary:      Effort: Pulmonary effort is normal. No respiratory distress.      Breath sounds: Normal breath sounds. No wheezing, rhonchi or rales.   Abdominal:      General: Abdomen is flat. Bowel sounds are normal. There is no distension.      Palpations: Abdomen is soft. There is no mass.      Tenderness: There is generalized abdominal " tenderness and tenderness in the epigastric area.   Musculoskeletal:         General: Normal range of motion.      Cervical back: Normal range of motion and neck supple.   Skin:     General: Skin is warm and dry.   Neurological:      General: No focal deficit present.      Mental Status: She is alert.   Psychiatric:         Mood and Affect: Mood normal.         Labs Reviewed:   Labs reviewed with patient, verbalized understanding.  Chemistry:  Lab Results   Component Value Date     12/18/2024    K 3.5 12/18/2024    BUN 10.0 12/18/2024    CREATININE 0.84 12/18/2024    EGFRNORACEVR >60 12/18/2024    GLUCOSE 113 (H) 12/18/2024    CALCIUM 10.8 (H) 12/18/2024    ALKPHOS 47 12/18/2024    LABPROT 8.9 (H) 12/18/2024    ALBUMIN 3.4 (L) 12/18/2024    BILIDIR 0.10 03/04/2020    IBILI 0.20 03/04/2020    AST 16 12/18/2024    ALT 11 12/18/2024    ANUZCMBF64XJ 19.3 (L) 07/19/2022    TSH 1.9051 07/19/2022        Lab Results   Component Value Date    HGBA1C 5.2 07/19/2022        Hematology:  Lab Results   Component Value Date    WBC 8.26 12/18/2024    HGB 12.3 12/18/2024    HCT 37.9 12/18/2024     12/18/2024       Lipid Panel:  Lab Results   Component Value Date    CHOL 199 07/19/2022    HDL 42 07/19/2022    .00 (H) 07/19/2022    TRIG 71 07/19/2022    TOTALCHOLEST 5 07/19/2022        Urine:  Lab Results   Component Value Date    APPEARANCEUA Cloudy (A) 06/08/2022    SGUA 1.025 06/08/2022    PROTEINUA Trace (A) 06/08/2022    KETONESUA Negative 06/08/2022    LEUKOCYTESUR Negative 06/08/2022    RBCUA 0-2 06/08/2022    WBCUA 3-5 06/08/2022    BACTERIA Moderate (A) 06/08/2022        Assessment:       ICD-10-CM ICD-9-CM   1. Breast cancer screening by mammogram  Z12.31 V76.12   2. Gastroesophageal reflux disease, unspecified whether esophagitis present  K21.9 530.81   3. History of peptic ulcer disease  Z87.11 V12.71   4. Hypertension, unspecified type  I10 401.9   5. Hypothyroidism, unspecified type  E03.9 244.9   6.  Epigastric abdominal pain  R10.13 789.06   7. Encounter for screening for HIV  Z11.4 V73.89   8. Need for hepatitis C screening test  Z11.59 V73.89   9. Cervical cancer screening  Z12.4 V76.2        Plan:     1. Breast cancer screening by mammogram  -     Mammo Digital Screening Bilat w/ Domenico; Future; Expected date: 12/19/2024    2. Gastroesophageal reflux disease, unspecified whether esophagitis present  -     Ambulatory referral/consult to Gastroenterology; Future; Expected date: 12/26/2024  -     POCT H. pylori    3. History of peptic ulcer disease  -     Ambulatory referral/consult to Gastroenterology; Future; Expected date: 12/26/2024  -     POCT H. pylori    4. Hypertension, unspecified type  -     CBC Auto Differential; Future; Expected date: 12/19/2024  -     Comprehensive Metabolic Panel; Future; Expected date: 12/19/2024  -     Vitamin D; Future; Expected date: 12/19/2024  -     Lipid Panel; Future; Expected date: 12/19/2024  -     Hemoglobin A1C; Future; Expected date: 12/19/2024  -     Urinalysis; Future; Expected date: 12/19/2024    5. Hypothyroidism, unspecified type  -     TSH; Future; Expected date: 12/19/2024  -     T3, Free (OLG); Future; Expected date: 12/19/2024  -     T4, Free; Future; Expected date: 12/19/2024  -     US Soft Tissue Head Neck; Future; Expected date: 12/19/2024    6. Epigastric abdominal pain  -     Ambulatory referral/consult to Gastroenterology; Future; Expected date: 12/26/2024    7. Encounter for screening for HIV  -     HIV 1/2 Ag/Ab (4th Gen); Future; Expected date: 12/19/2024    8. Need for hepatitis C screening test  -     Hepatitis C Antibody; Future; Expected date: 12/19/2024    9. Cervical cancer screening  -     Ambulatory referral/consult to Obstetrics / Gynecology; Future; Expected date: 12/26/2024         No follow-ups on file. In addition to their scheduled follow up, the patient has also been instructed to follow up on as needed basis.     Future Appointments    Date Time Provider Department Center   1/3/2025  1:00 PM [facility] [id number] [facility] MAMMO [place]

## 2024-12-20 PROBLEM — Z76.89 ENCOUNTER TO ESTABLISH CARE: Status: ACTIVE | Noted: 2024-12-20

## 2024-12-20 PROBLEM — Z12.4 CERVICAL CANCER SCREENING: Status: ACTIVE | Noted: 2024-12-20

## 2024-12-20 PROBLEM — R10.13 EPIGASTRIC PAIN: Status: ACTIVE | Noted: 2024-12-20

## 2024-12-20 PROBLEM — Z11.59 NEED FOR HEPATITIS C SCREENING TEST: Status: ACTIVE | Noted: 2024-12-20

## 2024-12-20 PROBLEM — Z11.4 ENCOUNTER FOR SCREENING FOR HIV: Status: ACTIVE | Noted: 2024-12-20

## 2025-01-13 ENCOUNTER — HOSPITAL ENCOUNTER (OUTPATIENT)
Dept: RADIOLOGY | Facility: HOSPITAL | Age: 45
Discharge: HOME | End: 2025-01-13
Attending: NURSE PRACTITIONER
Payer: MEDICAID

## 2025-01-13 DIAGNOSIS — E03.9 HYPOTHYROIDISM, UNSPECIFIED TYPE: Chronic | ICD-10-CM

## 2025-01-13 DIAGNOSIS — Z12.31 BREAST CANCER SCREENING BY MAMMOGRAM: ICD-10-CM

## 2025-01-13 DIAGNOSIS — K57.30 DIVERTICULOSIS OF COLON: ICD-10-CM

## 2025-01-13 DIAGNOSIS — R10.13 EPIGASTRIC PAIN: ICD-10-CM

## 2025-01-13 PROCEDURE — 25500020 PHARM REV CODE 255: Performed by: NURSE PRACTITIONER

## 2025-01-13 PROCEDURE — 76536 US EXAM OF HEAD AND NECK: CPT | Mod: TC

## 2025-01-13 PROCEDURE — 77067 SCR MAMMO BI INCL CAD: CPT | Mod: TC

## 2025-01-13 PROCEDURE — 77067 SCR MAMMO BI INCL CAD: CPT | Mod: 26,,, | Performed by: STUDENT IN AN ORGANIZED HEALTH CARE EDUCATION/TRAINING PROGRAM

## 2025-01-13 PROCEDURE — 74170 CT ABD WO CNTRST FLWD CNTRST: CPT | Mod: TC

## 2025-01-13 PROCEDURE — 77063 BREAST TOMOSYNTHESIS BI: CPT | Mod: 26,,, | Performed by: STUDENT IN AN ORGANIZED HEALTH CARE EDUCATION/TRAINING PROGRAM

## 2025-01-13 RX ORDER — IOPAMIDOL 755 MG/ML
100 INJECTION, SOLUTION INTRAVASCULAR
Status: COMPLETED | OUTPATIENT
Start: 2025-01-13 | End: 2025-01-13

## 2025-01-13 RX ADMIN — IOPAMIDOL 100 ML: 755 INJECTION, SOLUTION INTRAVENOUS at 02:01

## 2025-01-14 ENCOUNTER — PATIENT MESSAGE (OUTPATIENT)
Dept: FAMILY MEDICINE | Facility: CLINIC | Age: 45
End: 2025-01-14
Payer: MEDICAID

## 2025-01-24 ENCOUNTER — PATIENT MESSAGE (OUTPATIENT)
Dept: FAMILY MEDICINE | Facility: CLINIC | Age: 45
End: 2025-01-24
Payer: MEDICAID

## 2025-03-24 ENCOUNTER — ANESTHESIA EVENT (OUTPATIENT)
Dept: SURGERY | Facility: HOSPITAL | Age: 45
End: 2025-03-24
Payer: MEDICAID

## 2025-03-24 NOTE — ANESTHESIA PREPROCEDURE EVALUATION
03/24/2025  Tatiana Salgado is a 45 y.o., female.      Pre-op Assessment    I have reviewed the Patient Summary Reports.     I have reviewed the Nursing Notes. I have reviewed the NPO Status.   I have reviewed the Medications.     Review of Systems  Anesthesia Hx:  No problems with previous Anesthesia             Denies Family Hx of Anesthesia complications.    Denies Personal Hx of Anesthesia complications.                    Social:  Non-Smoker       Cardiovascular:  Cardiovascular Normal                                              Pulmonary:  Pulmonary Normal                       Renal/:  Renal/ Normal                 Hepatic/GI:  Hepatic/GI Normal                    Musculoskeletal:  Musculoskeletal Normal                Neurological:  Neurology Normal                                      Endocrine:  Endocrine Normal            Psych:  Psychiatric Normal                    Physical Exam  General: Well nourished, Cooperative, Alert and Oriented    Airway:  Mallampati: II / II  Mouth Opening: Normal  TM Distance: Normal  Tongue: Normal  Neck ROM: Normal ROM    Dental:  Intact    Chest/Lungs:  Normal Respiratory Rate    Heart:  Rate: Normal    Musculoskeletal:  Normal mobility      Anesthesia Plan  Type of Anesthesia, risks & benefits discussed:    Anesthesia Type: MAC  Intra-op Monitoring Plan: Standard ASA Monitors  Post Op Pain Control Plan: multimodal analgesia  Induction:  IV  Informed Consent: Informed consent signed with the Patient and all parties understand the risks and agree with anesthesia plan.  All questions answered.   ASA Score: 2  Day of Surgery Review of History & Physical: H&P Update referred to the surgeon/provider.  Anesthesia Plan Notes: Anesthesia plan was discussed with patient and/or representative. Risks and alternatives were discussed including the possibility of alteration of  plan.     Ready For Surgery From Anesthesia Perspective.     .

## 2025-03-27 ENCOUNTER — ANESTHESIA (OUTPATIENT)
Dept: SURGERY | Facility: HOSPITAL | Age: 45
End: 2025-03-27
Payer: MEDICAID

## 2025-03-27 ENCOUNTER — HOSPITAL ENCOUNTER (OUTPATIENT)
Facility: HOSPITAL | Age: 45
Discharge: HOME | End: 2025-03-27
Attending: INTERNAL MEDICINE | Admitting: INTERNAL MEDICINE
Payer: MEDICAID

## 2025-03-27 VITALS
SYSTOLIC BLOOD PRESSURE: 158 MMHG | HEIGHT: 65 IN | DIASTOLIC BLOOD PRESSURE: 91 MMHG | OXYGEN SATURATION: 100 % | RESPIRATION RATE: 20 BRPM | TEMPERATURE: 98 F | HEART RATE: 77 BPM | WEIGHT: 198 LBS | BODY MASS INDEX: 32.99 KG/M2

## 2025-03-27 DIAGNOSIS — K30 INDIGESTION: Primary | ICD-10-CM

## 2025-03-27 LAB — B-HCG UR QL: NEGATIVE

## 2025-03-27 PROCEDURE — 27201423 OPTIME MED/SURG SUP & DEVICES STERILE SUPPLY: Performed by: INTERNAL MEDICINE

## 2025-03-27 PROCEDURE — 63600175 PHARM REV CODE 636 W HCPCS: Performed by: NURSE ANESTHETIST, CERTIFIED REGISTERED

## 2025-03-27 PROCEDURE — 43239 EGD BIOPSY SINGLE/MULTIPLE: CPT | Performed by: INTERNAL MEDICINE

## 2025-03-27 PROCEDURE — 88305 TISSUE EXAM BY PATHOLOGIST: CPT | Performed by: INTERNAL MEDICINE

## 2025-03-27 PROCEDURE — 37000008 HC ANESTHESIA 1ST 15 MINUTES: Performed by: INTERNAL MEDICINE

## 2025-03-27 PROCEDURE — 81025 URINE PREGNANCY TEST: CPT | Performed by: INTERNAL MEDICINE

## 2025-03-27 RX ORDER — LIDOCAINE HYDROCHLORIDE 10 MG/ML
INJECTION, SOLUTION EPIDURAL; INFILTRATION; INTRACAUDAL; PERINEURAL
Status: DISCONTINUED | OUTPATIENT
Start: 2025-03-27 | End: 2025-03-27

## 2025-03-27 RX ORDER — PROPOFOL 10 MG/ML
VIAL (ML) INTRAVENOUS
Status: DISCONTINUED | OUTPATIENT
Start: 2025-03-27 | End: 2025-03-27

## 2025-03-27 RX ORDER — ONDANSETRON HYDROCHLORIDE 2 MG/ML
INJECTION, SOLUTION INTRAVENOUS
Status: DISCONTINUED | OUTPATIENT
Start: 2025-03-27 | End: 2025-03-27

## 2025-03-27 RX ADMIN — ONDANSETRON HYDROCHLORIDE 4 MG: 2 SOLUTION INTRAMUSCULAR; INTRAVENOUS at 11:03

## 2025-03-27 RX ADMIN — LIDOCAINE HYDROCHLORIDE 20 MG: 10 INJECTION, SOLUTION EPIDURAL; INFILTRATION; INTRACAUDAL; PERINEURAL at 11:03

## 2025-03-27 RX ADMIN — PROPOFOL 100 MG: 10 INJECTION, EMULSION INTRAVENOUS at 11:03

## 2025-03-27 NOTE — ANESTHESIA POSTPROCEDURE EVALUATION
Anesthesia Post Evaluation    Patient: Tatiana Salgado    Procedure(s) Performed: Procedure(s) (LRB):  EGD (ESOPHAGOGASTRODUODENOSCOPY) (N/A)    Final Anesthesia Type: MAC      Patient location during evaluation: med/surg floor  Patient participation: Yes- Able to Participate  Level of consciousness: awake and alert  Post-procedure vital signs: reviewed and stable  Pain management: adequate  Airway patency: patent    PONV status at discharge: No PONV  Anesthetic complications: no      Cardiovascular status: blood pressure returned to baseline  Respiratory status: unassisted  Hydration status: euvolemic  Follow-up not needed.              Vitals Value Taken Time   BP  03/27/25 11:59   Temp  03/27/25 11:59   Pulse  03/27/25 11:59   Resp  03/27/25 11:59   SpO2  03/27/25 11:59         No case tracking events are documented in the log.      Pain/Cristina Score: No data recorded

## 2025-03-27 NOTE — H&P
Endoscopy History and Physical    PCP - Sara Estrada FNP    Procedure - EGD  Sedation: MAC  ASA - per anesthesia  Mallampati - per anesthesia  History of Anesthesia problems - no  Family history Anesthesia problems -  no     HPI:  This is a 45 y.o. female here for evaluation of :  indigestion    Reflux - no  Dysphagia - no  Abdominal pain - no  Diarrhea - no    ROS:  Constitutional: No fevers, chills, No weight loss  ENT: No allergies  CV: No chest pain  Pulm: No cough, No shortness of breath  Ophtho: No vision changes  GI: see HPI  Medical History:  has a past medical history of Anxiety, Diverticulitis large intestine, DVT of upper extremity (deep vein thrombosis) (2017), History of methicillin resistant staphylococcus aureus (MRSA), Hypertension, and Postoperative hypothyroidism.    Surgical History:  has a past surgical history that includes Cold knife conization of cervix and Thyroidectomy.    Family History: family history includes Cardiomegaly in her mother; Heart disease in her mother; Heart failure in her mother; Hypertension in her mother; No Known Problems in her father.. Otherwise no colon cancer, inflammatory bowel disease, or GI malignancies.    Social History:  reports that she has never smoked. She has never used smokeless tobacco. She reports that she does not currently use alcohol. She reports that she does not use drugs.    Review of patient's allergies indicates:   Allergen Reactions    Morphine Rash       Medications:   Prescriptions Prior to Admission[1]    Objective Findings:    Vital Signs: Per nursing notes.    Physical Exam:  General Appearance: Well appearing in no acute distress  Head:   Normocephalic, without obvious abnormality  Eyes:    No scleral icterus  Airway: Open  Neck: No restriction in mobility  Lungs: CTA bilaterally in anterior and posterior fields, no wheezes, no crackles.  Heart:  Regular rate and rhythm, S1, S2 normal, no murmurs heard  Abdomen: Soft, non tender, non  distended      Labs:  Lab Results   Component Value Date    WBC 6.77 12/19/2024    HGB 12.6 12/19/2024    HCT 39.3 12/19/2024     12/19/2024    CHOL 174 12/19/2024    TRIG 65 12/19/2024    HDL 42 12/19/2024    ALT 10 12/19/2024    AST 15 12/19/2024     12/19/2024    K 3.8 12/19/2024     12/19/2024    CREATININE 0.97 12/19/2024    BUN 12.0 12/19/2024    CO2 25 12/19/2024    TSH 1.942 12/19/2024    HGBA1C 5.4 12/19/2024         I have explained the risks and benefits of endoscopy procedures to the patient including but not limited to bleeding, perforation, infection, and death.    Thank you so much for allowing me to participate in the care of Tatiana Blackburn MD         [1]   Medications Prior to Admission   Medication Sig Dispense Refill Last Dose/Taking    amLODIPine (NORVASC) 10 MG tablet Take 10 mg by mouth once daily.   3/27/2025    hydroCHLOROthiazide (HYDRODIURIL) 12.5 MG Tab Take 12.5 mg by mouth once daily.   3/24/2025    levothyroxine (SYNTHROID) 75 MCG tablet Take 1 tablet (75 mcg total) by mouth once daily. 30 tablet 0 3/27/2025    pantoprazole (PROTONIX) 40 MG tablet Take 40 mg by mouth once daily.   3/26/2025    sucralfate (CARAFATE) 1 gram tablet Take 1 tablet (1 g total) by mouth 4 (four) times daily as needed (upper abdominal pain). 30 tablet 0 More than a month

## 2025-03-27 NOTE — DISCHARGE SUMMARY
[facility]  Discharge Note  Short Stay    Procedure(s) (LRB):  EGD (ESOPHAGOGASTRODUODENOSCOPY) (N/A)      OUTCOME: Patient tolerated treatment/procedure well without complication and is now ready for discharge.    DISPOSITION: Home or Self Care    FINAL DIAGNOSIS:  erosive gastritis    FOLLOWUP: In clinic with NP/PA in 4-8 weeks     DISCHARGE INSTRUCTIONS:  No discharge procedures on file.      Clinical Reference Documents Added to Patient Instructions         Document    UPPER GI ENDOSCOPY DISCHARGE INSTRUCTIONS (ENGLISH)            TIME SPENT ON DISCHARGE: 15 minutes

## 2025-03-27 NOTE — PROCEDURES
EGD Report    Date of procedure: 03/27/2025     Surgeon: Gerald Blackburn    ASA: per anesthesia     Medications: Per anesthesia    Indication: indigestion    Procedure: EGD with biopsies of the stomach    Description of the Procedure: The patient was brought back to the endoscopy suite where the risks, benefits, and alternatives of the procedure were described in detail. The patient was given the opportunity to ask questions and then signed informed consent. Patient was positioned in the left lateral decubitus position, continuous monitoring was initiated, and supplemental oxygen was provided via nasal cannula. Bite block was placed. Adequate sedation was achieved with the above mentioned medications as documented in chart and then titrated during the entire procedure. Under direct visualization the gastroscope was introduced through the oropharynx into the esophagus. The scope was advanced into the stomach and to the second portion of the duodenum. Scope was withdrawn and the mucosa was carefully examined. The entire gastric mucosa was examined, including the fundus with retroflexion. Biopsies were taken to rule out h pylori. Air was evacuated from the stomach and the scope was withdrawn into the esophagus. The entire esophageal mucosa was examined. The procedure was completed. The patient tolerated the procedure well and was transferred to the recovery area in stable condition.     Estimated Blood Loss: minimal    Complications: none    Findings:  Erosive gastritis     Impression and Recommendations:   Continue Pantoprazole 40mg po qday  Sucralfate 1g po qid  Follow up with NP/PA in 4-8 weeks     Gerald Blackburn

## 2025-03-27 NOTE — DISCHARGE INSTRUCTIONS
No Driving Today, do not operate any heavy machinery or sign any important documents over the next 24 hours.     Dr. Blackburn's office will contact you in 7-10 days with your results from today's procedure.     Today's biopsy was performed to test for H. Pylori    Resume home medications and diet    Take Protonix every day first thing in the morning on an empty stomach at least 30 minutes before breakfast (you may take it with your levothyroxine)    Take Sucralfate up to 4 times a day, 30 minutes before meals and bedtime.     Drink plenty fluids with electrolytes over the next 24 hours

## 2025-03-28 LAB — PSYCHE PATHOLOGY RESULT: NORMAL

## 2025-04-10 ENCOUNTER — PATIENT MESSAGE (OUTPATIENT)
Facility: CLINIC | Age: 45
End: 2025-04-10
Payer: MEDICAID

## 2025-04-26 ENCOUNTER — HOSPITAL ENCOUNTER (EMERGENCY)
Facility: HOSPITAL | Age: 45
Discharge: HOME | End: 2025-04-26
Attending: INTERNAL MEDICINE
Payer: MEDICAID

## 2025-04-26 VITALS
TEMPERATURE: 98 F | SYSTOLIC BLOOD PRESSURE: 148 MMHG | OXYGEN SATURATION: 99 % | DIASTOLIC BLOOD PRESSURE: 99 MMHG | HEART RATE: 66 BPM | RESPIRATION RATE: 18 BRPM | BODY MASS INDEX: 33.81 KG/M2 | WEIGHT: 203.19 LBS

## 2025-04-26 DIAGNOSIS — M25.561 RIGHT KNEE PAIN: ICD-10-CM

## 2025-04-26 PROCEDURE — 99283 EMERGENCY DEPT VISIT LOW MDM: CPT | Mod: 25

## 2025-04-26 PROCEDURE — 25000003 PHARM REV CODE 250: Performed by: INTERNAL MEDICINE

## 2025-04-26 RX ORDER — ACETAMINOPHEN 500 MG
1000 TABLET ORAL
Status: COMPLETED | OUTPATIENT
Start: 2025-04-26 | End: 2025-04-26

## 2025-04-26 RX ADMIN — ACETAMINOPHEN 1000 MG: 500 TABLET ORAL at 11:04

## 2025-04-27 NOTE — ED PROVIDER NOTES
Encounter Date: 4/26/2025  History from patient     History     Chief Complaint   Patient presents with    Knee Pain     Right knee pain x1 week. Denies injury       HPI    Tatiana Salgado is 45 y.o. female who  has a past medical history of Anxiety, Diverticulitis large intestine, DVT of upper extremity (deep vein thrombosis) (2017), History of methicillin resistant staphylococcus aureus (MRSA), Hypertension, and Postoperative hypothyroidism. arrives in ER with c/o Knee Pain (Right knee pain x1 week. Denies injury/)    Review of patient's allergies indicates:   Allergen Reactions    Morphine Rash     Past Medical History:   Diagnosis Date    Anxiety     Diverticulitis large intestine     DVT of upper extremity (deep vein thrombosis) 2017    r/t spider bite    History of methicillin resistant staphylococcus aureus (MRSA)     Hypertension     Postoperative hypothyroidism      Past Surgical History:   Procedure Laterality Date    COLD KNIFE CONIZATION OF CERVIX      ESOPHAGOGASTRODUODENOSCOPY N/A 3/27/2025    Procedure: EGD (ESOPHAGOGASTRODUODENOSCOPY);  Surgeon: Gerald Blackburn MD;  Location: [facility];  Service: Gastroenterology;  Laterality: N/A;    THYROIDECTOMY       Family History   Problem Relation Name Age of Onset    Heart disease Mother      Hypertension Mother      Heart failure Mother      Cardiomegaly Mother      No Known Problems Father       Social History[1]  Review of Systems   Constitutional:  Negative for fever.   HENT:  Negative for trouble swallowing and voice change.    Eyes:  Negative for visual disturbance.   Respiratory:  Negative for cough and shortness of breath.    Cardiovascular:  Negative for chest pain.   Gastrointestinal:  Negative for abdominal pain, diarrhea and vomiting.   Genitourinary:  Negative for dysuria and hematuria.   Musculoskeletal:  Negative for back pain and gait problem.        Right knee pain for a week or more without any injury   Skin:  Negative for color change and  rash.   Neurological:  Negative for headaches.   Psychiatric/Behavioral:  Negative for behavioral problems and sleep disturbance.    All other systems reviewed and are negative.      Physical Exam     Initial Vitals [04/26/25 2243]   BP Pulse Resp Temp SpO2   (!) 158/109 72 18 98 °F (36.7 °C) 100 %      MAP       --         Physical Exam    Nursing note and vitals reviewed.  Constitutional: She appears well-developed and well-nourished. No distress.   HENT:   Head: Atraumatic.   Eyes: EOM are normal.   Neck: Neck supple.   Cardiovascular:  Normal rate and regular rhythm.           Pulmonary/Chest: Breath sounds normal. No respiratory distress.   Musculoskeletal:         General: No tenderness or edema. Normal range of motion.      Cervical back: Neck supple.      Comments: No swelling on the knee, no tenderness when the knee is extended, when you Bend it, that is when she complains of minimal tenderness anteriorly.  No signs of inflammation, no effusion, no ecchymosis     Neurological: She is alert and oriented to person, place, and time.   Skin: Skin is warm and dry.   Psychiatric: She has a normal mood and affect.         ED Course   Procedures  Labs Reviewed - No data to display       Imaging Results              X-Ray Knee 3 View Right (Preliminary result)  Result time 04/26/25 23:10:03      Wet Read by Caridad Stern MD (04/26/25 23:10:03, [facility] - Emergency Dept, Emergency Medicine)    X-Ray, Independently Interpreted by Caridad Stern MD.  Right knee three views: No acute fractures identified                                     Medications   acetaminophen tablet 1,000 mg (has no administration in time range)     Medical Decision Making    Tatiana Salgado is 45 y.o. female who  has a past medical history of Anxiety, Diverticulitis large intestine, DVT of upper extremity (deep vein thrombosis) (2017), History of methicillin resistant staphylococcus aureus (MRSA), Hypertension, and  Postoperative hypothyroidism. arrives in ER with c/o Knee Pain (Right knee pain x1 week. Denies injury/)    Patient comes to the emergency room with complaint of right knee pain which has been going on for a week or more.  Denies any injuries, she says when she walks on it it hurts, if she is laying down it is not hurting.  She does not have any swelling, no tenderness as such.  No swelling, no ecchymosis, no effusion, when she bends the knee that is when she complains of anterior knee tenderness.  No signs of inflammation.    She says she definitely not pregnant, I advised her that I can do the x-ray of the knee but if she did not have any injuries I doubt that I will find any abnormality on X-ray in there.  She has a history of peptic ulcer disease, I will advise her to take Tylenol 2 tablets as needed for pain and see her family doctor for follow up.    Amount and/or Complexity of Data Reviewed  Radiology: ordered.               ED Course as of 04/26/25 2312   Sat Apr 26, 2025   2311 No acute abnormality on the x-ray.  I will advise her to take 2 tablets of Tylenol 3 times a day as needed for pain and see family doctor for follow up and further workup.  They can always get an MRI done on it if she continues to have the problem.  They can also refer her to physical therapist for further treatment as needed. [GQ]      ED Course User Index  [GQ] Caridad Stern MD                           Clinical Impression:  Final diagnoses:  [M25.561] Right knee pain          ED Disposition Condition    Discharge Stable          ED Prescriptions    None       Follow-up Information       Follow up With Specialties Details Why Contact Info    Sara Estrada FNP Family Medicine In 2 days  [address] [place] [address]  [phone]                 [1]   Social History  Tobacco Use    Smoking status: Never    Smokeless tobacco: Never   Substance Use Topics    Alcohol use: Not Currently    Drug use: Never        Jarred  Caridad TRIPLETT MD  04/26/25 [id number]

## 2025-04-27 NOTE — DISCHARGE INSTRUCTIONS
With a history of peptic ulcer, Tylenol 2 tablet 3 times a day as needed for pain we will be the safest medicines for you.    Must See your family doctor in one to 2 days for further evaluation, workup, and treatment as necessary    Avoid driving or operating machinery while taking medicines as some medicines might cause drowsiness and may cause problems. Also pain medicines have potential of being addictive  so use Pain meds specially Narcotics Sparingly.    The exam and treatment you received in Emergency Room was for an urgent problem and NOT INTENDED AS COMPLETE CARE. It is important that you FOLLOW UP with a doctor for ongoing care. If your symptoms become WORSE or you DO NOT IMPROVE and you are unable to reach your health care provider, you should RETURN to the emergency department. The Emergency Room doctor has provided a PRELIMINARY INTERPRETATION of all your STUDIES. A final interpretation may be done after you are discharged. IF A CHANGE in your diagnosis or treatment is needed WE WILL CONTACT YOU. It is critical that we have a CURRENT PHONE NUMBER FOR YOU.

## 2025-04-29 ENCOUNTER — OFFICE VISIT (OUTPATIENT)
Dept: FAMILY MEDICINE | Facility: CLINIC | Age: 45
End: 2025-04-29
Payer: MEDICAID

## 2025-04-29 ENCOUNTER — HOSPITAL ENCOUNTER (OUTPATIENT)
Dept: RADIOLOGY | Facility: HOSPITAL | Age: 45
Discharge: HOME | End: 2025-04-29
Attending: NURSE PRACTITIONER
Payer: MEDICAID

## 2025-04-29 VITALS
DIASTOLIC BLOOD PRESSURE: 89 MMHG | TEMPERATURE: 97 F | WEIGHT: 203.25 LBS | SYSTOLIC BLOOD PRESSURE: 138 MMHG | RESPIRATION RATE: 16 BRPM | HEIGHT: 65 IN | OXYGEN SATURATION: 99 % | HEART RATE: 87 BPM | BODY MASS INDEX: 33.86 KG/M2

## 2025-04-29 DIAGNOSIS — E66.811 CLASS 1 OBESITY: ICD-10-CM

## 2025-04-29 DIAGNOSIS — E66.811 OBESITY (BMI 30.0-34.9): ICD-10-CM

## 2025-04-29 DIAGNOSIS — R79.89 LOW SERUM VITAMIN D: ICD-10-CM

## 2025-04-29 DIAGNOSIS — E03.9 ACQUIRED HYPOTHYROIDISM: Chronic | ICD-10-CM

## 2025-04-29 DIAGNOSIS — M25.561 ACUTE PAIN OF RIGHT KNEE: Primary | ICD-10-CM

## 2025-04-29 DIAGNOSIS — Z86.718 HISTORY OF DVT (DEEP VEIN THROMBOSIS): ICD-10-CM

## 2025-04-29 DIAGNOSIS — E87.6 HYPOKALEMIA: ICD-10-CM

## 2025-04-29 PROBLEM — Z76.89 ENCOUNTER TO ESTABLISH CARE: Status: RESOLVED | Noted: 2024-12-20 | Resolved: 2025-04-29

## 2025-04-29 PROBLEM — Z11.4 ENCOUNTER FOR SCREENING FOR HIV: Status: RESOLVED | Noted: 2024-12-20 | Resolved: 2025-04-29

## 2025-04-29 PROBLEM — Z11.59 NEED FOR HEPATITIS C SCREENING TEST: Status: RESOLVED | Noted: 2024-12-20 | Resolved: 2025-04-29

## 2025-04-29 PROBLEM — E66.01 MORBID OBESITY: Chronic | Status: RESOLVED | Noted: 2022-06-08 | Resolved: 2025-04-29

## 2025-04-29 PROBLEM — I82.622 ACUTE DEEP VEIN THROMBOSIS (DVT) OF BRACHIAL VEIN OF LEFT UPPER EXTREMITY: Status: RESOLVED | Noted: 2022-06-08 | Resolved: 2025-04-29

## 2025-04-29 PROCEDURE — 93971 EXTREMITY STUDY: CPT | Mod: TC,RT

## 2025-04-29 PROCEDURE — 99999 PR PBB SHADOW E&M-EST. PATIENT-LVL IV: CPT | Mod: PBBFAC,,, | Performed by: NURSE PRACTITIONER

## 2025-04-29 PROCEDURE — 99214 OFFICE O/P EST MOD 30 MIN: CPT | Mod: PBBFAC,25 | Performed by: NURSE PRACTITIONER

## 2025-04-29 RX ORDER — IBUPROFEN 800 MG/1
800 TABLET ORAL 3 TIMES DAILY PRN
Qty: 90 TABLET | Refills: 1 | Status: SHIPPED | OUTPATIENT
Start: 2025-04-29

## 2025-04-29 NOTE — PROGRESS NOTES
Family Medicine    Patient ID: [id number]     Chief Complaint: Knee Pain and Hospital Follow Up      HPI:     Tatiana Saglado is here today for right knee pain, previous xray is negative.    Past Medical History:   Diagnosis Date    Acute deep vein thrombosis (DVT) of brachial vein of left upper extremity 06/08/2022    Anxiety     Diverticulitis large intestine     DVT of upper extremity (deep vein thrombosis) 2017    r/t spider bite    Encounter for screening for HIV 12/20/2024    Her matrix      Encounter to establish care 12/20/2024    In clinic to establish care, patient has not been seen in clinic for more than 2 years.  Labs and imaging is ordered, referrals are made for OBGYN and for GI.      History of methicillin resistant staphylococcus aureus (MRSA)     Hypertension     Morbid obesity 06/08/2022    Need for hepatitis C screening test 12/20/2024    Postoperative hypothyroidism         Past Surgical History:   Procedure Laterality Date    COLD KNIFE CONIZATION OF CERVIX      ESOPHAGOGASTRODUODENOSCOPY N/A 3/27/2025    Procedure: EGD (ESOPHAGOGASTRODUODENOSCOPY);  Surgeon: Gerald Blackburn MD;  Location: [facility];  Service: Gastroenterology;  Laterality: N/A;    THYROIDECTOMY          Social History     Tobacco Use    Smoking status: Never    Smokeless tobacco: Never   Substance and Sexual Activity    Alcohol use: Not Currently    Drug use: Never    Sexual activity: Yes     Partners: Male     Birth control/protection: None        Current Outpatient Medications   Medication Instructions    amLODIPine (NORVASC) 10 mg, Daily    hydroCHLOROthiazide 12.5 mg, Daily    ibuprofen (ADVIL,MOTRIN) 800 mg, Oral, 3 times daily PRN    levothyroxine (SYNTHROID) 75 mcg, Oral, Daily    pantoprazole (PROTONIX) 40 mg, Daily    sucralfate (CARAFATE) 1 g, Oral, 4 times daily PRN       Review of patient's allergies indicates:   Allergen Reactions    Morphine Rash        Patient Care Team:  Sara Estrada FNP as  "PCP - General (Family Medicine)  Anthony Stafford MD as Consulting Physician (Cardiology)     Subjective:     Review of Systems   Constitutional:  Negative for appetite change, chills, diaphoresis and fever.   HENT:  Negative for ear pain, sinus pain and sore throat.    Eyes:  Negative for pain and visual disturbance.   Respiratory:  Negative for cough, shortness of breath and wheezing.    Cardiovascular:  Negative for chest pain, palpitations and leg swelling.   Gastrointestinal:  Negative for abdominal pain, blood in stool, diarrhea, nausea and vomiting.   Endocrine: Negative for cold intolerance.   Genitourinary:  Negative for difficulty urinating, dysuria, frequency and hematuria.   Musculoskeletal:  Positive for gait problem (Right knee pain no trauma, limping) and joint swelling (Right knee swelling.  Visible). Negative for arthralgias and myalgias.   Skin:  Negative for color change and rash.   Allergic/Immunologic: Negative.    Neurological:  Negative for dizziness, syncope, light-headedness and numbness.   Hematological: Negative.    Psychiatric/Behavioral: Negative.  Negative for dysphoric mood and suicidal ideas. The patient is not nervous/anxious.    All other systems reviewed and are negative.      12 point review of systems conducted, negative except as stated in the history of present illness. See HPI for details.    Objective:     Visit Vitals  /89   Pulse 87   Temp 97 °F (36.1 °C) (Temporal)   Resp 16   Ht 5' 5" (1.651 m)   Wt 92.2 kg (203 lb 4.2 oz)   LMP 04/16/2025 (Exact Date)   SpO2 99%   BMI 33.82 kg/m²       Physical Exam  Vitals and nursing note reviewed.   Constitutional:       General: She is not in acute distress.     Appearance: She is not ill-appearing.   HENT:      Head: Normocephalic and atraumatic.      Mouth/Throat:      Mouth: Mucous membranes are moist.      Pharynx: Oropharynx is clear.   Eyes:      General: No scleral icterus.     Extraocular Movements: Extraocular movements " intact.      Conjunctiva/sclera: Conjunctivae normal.      Pupils: Pupils are equal, round, and reactive to light.   Neck:      Vascular: No carotid bruit.   Cardiovascular:      Rate and Rhythm: Normal rate and regular rhythm.      Heart sounds: No murmur heard.     No friction rub. No gallop.   Pulmonary:      Effort: Pulmonary effort is normal. No respiratory distress.      Breath sounds: Normal breath sounds. No wheezing, rhonchi or rales.   Abdominal:      General: Abdomen is flat. Bowel sounds are normal. There is no distension.      Palpations: Abdomen is soft. There is no mass.      Tenderness: There is no abdominal tenderness.   Musculoskeletal:         General: Normal range of motion.      Cervical back: Normal range of motion and neck supple.      Right knee: Effusion (With heat) and erythema present. Tenderness present over the MCL and LCL.      Instability Tests: Anterior Lachman test positive.        Legs:    Skin:     General: Skin is warm and dry.   Neurological:      General: No focal deficit present.      Mental Status: She is alert.   Psychiatric:         Mood and Affect: Mood normal.         Labs Reviewed:   Labs reviewed with patient, verbalized understanding.  Chemistry:  Lab Results   Component Value Date     04/29/2025    K 3.3 (L) 04/29/2025    BUN 12.0 04/29/2025    CREATININE 0.97 04/29/2025    EGFRNORACEVR >60 04/29/2025    CALCIUM 10.4 (H) 04/29/2025    ALKPHOS 48 04/29/2025    ALBUMIN 3.3 (L) 04/29/2025    BILIDIR 0.10 03/04/2020    IBILI 0.20 03/04/2020    AST 15 04/29/2025    ALT 7 04/29/2025    [id number] 42 12/19/2024    TSH 1.030 04/29/2025    OMDPQV2GLOP 1.08 12/19/2024        Lab Results   Component Value Date    HGBA1C 5.4 04/29/2025        Hematology:  Lab Results   Component Value Date    WBC 7.02 04/29/2025    HGB 11.5 (L) 04/29/2025    HCT 35.6 (L) 04/29/2025     04/29/2025       Lipid Panel:  Lab Results   Component Value Date    CHOL 182 04/29/2025    HDL 49  04/29/2025    .00 04/29/2025    TRIG 108 04/29/2025    TOTALCHOLEST 4 04/29/2025        Urine:  Lab Results   Component Value Date    APPEARANCEUA Cloudy (A) 12/19/2024    SGUA >=1.030 12/19/2024    PROTEINUA 2+ (A) 12/19/2024    KETONESUA Negative 12/19/2024    LEUKOCYTESUR Negative 12/19/2024    RBCUA 21-50 (A) 12/19/2024    WBCUA 0-5 12/19/2024    BACTERIA None Seen 12/19/2024        Assessment:       ICD-10-CM ICD-9-CM   1. Acute pain of right knee  M25.561 719.46   2. Acquired hypothyroidism  E03.9 244.9   3. History of DVT (deep vein thrombosis)  Z86.718 V12.51   4. Hypokalemia  E87.6 276.8   5. Low serum vitamin D  R79.89 790.6   6. Obesity (BMI 30.0-34.9)  E66.811 278.00   7. Class 1 obesity  E66.811 278.00        Plan:     1. Acute pain of right knee  Assessment & Plan:  Right knee pain, xray is negative, CT ordered, ambulation is painful.    Orders:  -     Lipid Panel; Future; Expected date: 04/29/2025  -     Hemoglobin A1C; Future; Expected date: 04/29/2025  -     Uric Acid; Future; Expected date: 04/29/2025  -     CT Knee W W/O Contrast Right; Future; Expected date: 04/29/2025  -     ibuprofen (ADVIL,MOTRIN) 800 MG tablet; Take 1 tablet (800 mg total) by mouth 3 (three) times daily as needed for Pain.  Dispense: 90 tablet; Refill: 1    2. Acquired hypothyroidism  Overview:  TSH and t4/t3 ordered.    Assessment & Plan:  TSH is in normal limits will monitor. At 75mcg.  Advised patient to take levothyroxine in the morning with small sip of water with no other medications, no food.    Orders:  -     US Lower Extremity Veins Right; Future; Expected date: 04/29/2025  -     CBC Auto Differential; Future; Expected date: 04/29/2025  -     Comprehensive Metabolic Panel; Future; Expected date: 04/29/2025  -     TSH; Future; Expected date: 04/29/2025    3. History of DVT (deep vein thrombosis)  Assessment & Plan:  Patient has history of DVT.  X-ray of the knee is negative.  We will complete a uric acid level.   We will do D-dimer, we will do ultrasound of right knee, patient is complaining of pain to the right knee, right knee is inflamed, red, and swollen.  If negative we will do CT of the knee.    Orders:  -     D-Dimer, Quantitative; Future; Expected date: 04/29/2025    4. Hypokalemia  Assessment & Plan:  Patient takes hydrochlorothiazide 12.5 mg daily.  Potassium is 3.3.  Does note that she does have intermittent muscle spasms.  Advised patient to eat bananas oranges and potatoes.  At this time she will take OTC Micro-K daily.  We will recheck potassium in 2 weeks.    Orders:  -     Comprehensive Metabolic Panel; Future; Expected date: 05/13/2025    5. Low serum vitamin D  Assessment & Plan:  Has history of low vitamin-D, we will repeat vitamin-D at this time.    Orders:  -     Vitamin D; Future; Expected date: 05/13/2025    6. Obesity (BMI 30.0-34.9)  Assessment & Plan:  Discussed diet and exercise      7. Class 1 obesity  Assessment & Plan:  Discussed dietary changes, discussed increased activity.           Follow up in about 2 weeks (around 5/13/2025) for routine follow up. In addition to their scheduled follow up, the patient has also been instructed to follow up on as needed basis.     Future Appointments   Date Time Provider Department Center   12/29/2025  2:00 PM Cox, Lori, FNP [facility]

## 2025-04-29 NOTE — ASSESSMENT & PLAN NOTE
Patient takes hydrochlorothiazide 12.5 mg daily.  Potassium is 3.3.  Does note that she does have intermittent muscle spasms.  Advised patient to eat bananas oranges and potatoes.  At this time she will take OTC Micro-K daily.  We will recheck potassium in 2 weeks.

## 2025-04-29 NOTE — ASSESSMENT & PLAN NOTE
Patient has history of DVT.  X-ray of the knee is negative.  We will complete a uric acid level.  We will do D-dimer, we will do ultrasound of right knee, patient is complaining of pain to the right knee, right knee is inflamed, red, and swollen.  If negative we will do CT of the knee.

## 2025-05-15 ENCOUNTER — HOSPITAL ENCOUNTER (OUTPATIENT)
Dept: RADIOLOGY | Facility: HOSPITAL | Age: 45
Discharge: HOME | End: 2025-05-15
Attending: NURSE PRACTITIONER
Payer: MEDICAID

## 2025-05-15 DIAGNOSIS — M25.561 ACUTE PAIN OF RIGHT KNEE: ICD-10-CM

## 2025-05-15 PROCEDURE — 73702 CT LWR EXTREMITY W/O&W/DYE: CPT | Mod: TC,RT

## 2025-05-15 PROCEDURE — 25500020 PHARM REV CODE 255: Performed by: NURSE PRACTITIONER

## 2025-05-15 RX ORDER — IOPAMIDOL 755 MG/ML
100 INJECTION, SOLUTION INTRAVASCULAR
Status: COMPLETED | OUTPATIENT
Start: 2025-05-15 | End: 2025-05-15

## 2025-05-15 RX ADMIN — IOPAMIDOL 100 ML: 755 INJECTION, SOLUTION INTRAVENOUS at 07:05

## 2025-05-27 ENCOUNTER — DOCUMENTATION ONLY (OUTPATIENT)
Dept: FAMILY MEDICINE | Facility: CLINIC | Age: 45
End: 2025-05-27
Payer: MEDICAID

## 2025-05-27 DIAGNOSIS — M25.561 ACUTE PAIN OF RIGHT KNEE: Primary | ICD-10-CM

## 2025-05-27 NOTE — PROGRESS NOTES
Patient was called, she noted continuing pain and swelling to the right knee. MRI ordered, Will likely need to see orthopedic referral.

## 2025-06-04 ENCOUNTER — HOSPITAL ENCOUNTER (OUTPATIENT)
Dept: RADIOLOGY | Facility: HOSPITAL | Age: 45
Discharge: HOME | End: 2025-06-04
Attending: NURSE PRACTITIONER
Payer: MEDICAID

## 2025-06-04 DIAGNOSIS — M25.561 ACUTE PAIN OF RIGHT KNEE: ICD-10-CM

## 2025-06-04 PROCEDURE — 73721 MRI JNT OF LWR EXTRE W/O DYE: CPT | Mod: TC,RT

## 2025-06-10 ENCOUNTER — PATIENT MESSAGE (OUTPATIENT)
Dept: FAMILY MEDICINE | Facility: CLINIC | Age: 45
End: 2025-06-10
Payer: MEDICAID

## 2025-06-10 DIAGNOSIS — M25.461 EFFUSION OF RIGHT KNEE JOINT: Primary | ICD-10-CM

## 2025-07-09 ENCOUNTER — TELEPHONE (OUTPATIENT)
Dept: FAMILY MEDICINE | Facility: CLINIC | Age: 45
End: 2025-07-09
Payer: MEDICAID

## 2025-07-09 ENCOUNTER — ON-DEMAND VIRTUAL (OUTPATIENT)
Dept: URGENT CARE | Facility: CLINIC | Age: 45
End: 2025-07-09
Payer: MEDICAID

## 2025-07-09 ENCOUNTER — PATIENT MESSAGE (OUTPATIENT)
Dept: FAMILY MEDICINE | Facility: CLINIC | Age: 45
End: 2025-07-09
Payer: MEDICAID

## 2025-07-09 DIAGNOSIS — Z76.0 MEDICATION REFILL: ICD-10-CM

## 2025-07-09 DIAGNOSIS — R79.89 LOW SERUM VITAMIN D: ICD-10-CM

## 2025-07-09 DIAGNOSIS — E03.9 HYPOTHYROIDISM, UNSPECIFIED TYPE: Primary | ICD-10-CM

## 2025-07-09 DIAGNOSIS — E87.6 HYPOKALEMIA: ICD-10-CM

## 2025-07-09 DIAGNOSIS — E03.9 ACQUIRED HYPOTHYROIDISM: Primary | ICD-10-CM

## 2025-07-09 RX ORDER — LEVOTHYROXINE SODIUM 75 UG/1
75 TABLET ORAL DAILY
Qty: 30 TABLET | Refills: 0 | Status: SHIPPED | OUTPATIENT
Start: 2025-07-09 | End: 2025-08-08

## 2025-07-09 NOTE — PATIENT INSTRUCTIONS
Thank you for choosing [facility]!    Our goal in the [facility] to always provide outstanding medical care. You may receive a survey by mail or e-mail in the next week regarding your experience today. We would greatly appreciate you completing and returning the survey. Your feedback provides us with a way to recognize our staff who provide very good care, and it helps us learn how to improve when your experience was below our aspiration of excellence.         We appreciate you trusting us with your medical care. We hope you feel better soon. We will be happy to take care of you for all of your future medical needs.    You must understand that you've received Virtual  treatment only and that you may be released before all your medical problems are known or treated. You, the patient, will arrange for follow up care as instructed.    Follow up with your PCP or specialty clinic as directed in the next 1-2 weeks if not improved or as needed.  You can call [phone] to schedule an appointment with the appropriate provider.    If your condition worsens we recommend that you receive another evaluation in person, with your primary care provider, urgent care or at the emergency room immediately or contact your primary medical clinics after hours call service to discuss your concerns.

## 2025-07-09 NOTE — PROGRESS NOTES
Subjective:      Patient ID: Tatiana Salgado is a 45 y.o. female.    Vitals:  vitals were not taken for this visit.     Chief Complaint: Medication Refill      Visit Type: TELE AUDIOVISUAL    Patient Location: Home     Present with the patient at the time of consultation: TELEMED PRESENT WITH PATIENT: None    Past Medical History:   Diagnosis Date    Acute deep vein thrombosis (DVT) of brachial vein of left upper extremity 06/08/2022    Anxiety     Diverticulitis large intestine     DVT of upper extremity (deep vein thrombosis) 2017    r/t spider bite    Encounter for screening for HIV 12/20/2024    Her matrix      Encounter to establish care 12/20/2024    In clinic to establish care, patient has not been seen in clinic for more than 2 years.  Labs and imaging is ordered, referrals are made for OBGYN and for GI.      History of methicillin resistant staphylococcus aureus (MRSA)     Hypertension     Morbid obesity 06/08/2022    Need for hepatitis C screening test 12/20/2024    Postoperative hypothyroidism      Past Surgical History:   Procedure Laterality Date    COLD KNIFE CONIZATION OF CERVIX      ESOPHAGOGASTRODUODENOSCOPY N/A 3/27/2025    Procedure: EGD (ESOPHAGOGASTRODUODENOSCOPY);  Surgeon: Gerald Blackburn MD;  Location: [facility];  Service: Gastroenterology;  Laterality: N/A;    THYROIDECTOMY       Review of patient's allergies indicates:   Allergen Reactions    Morphine Rash     Medications Ordered Prior to Encounter[1]  Family History   Problem Relation Name Age of Onset    Heart disease Mother Sivan salgado     Hypertension Mother Sivan salgado     Heart failure Mother Sivan salgado     Cardiomegaly Mother Sivan salgado     No Known Problems Father         Medications Ordered                [id number] - [place] - [address] AT [facility] [id number] [address]    Telephone: [phone]   Fax: [phone]   Hours: Not open 24 hours                          E-Prescribed (1 of 1)              levothyroxine (SYNTHROID) 75 MCG tablet    Sig: Take 1 tablet (75 mcg total) by mouth once daily.       Start: 7/9/25     Quantity: 30 tablet Refills: 0                           Ohs Peq Odvv Intake    7/9/2025  8:40 AM CDT - Filed by Patient   What is your current physical address in the event of a medical emergency? [address]   Are you able to take your vital signs? No   Please attach any relevant images or files    Is your employer contracted with [facility]? No         Hx of hypothyroid. Had labs in April and were normal. Needs refill of levothyroxine. Has no sxs currently.     Medication Refill        Constitution: Negative.   HENT: Negative.     Neck: neck negative.   Eyes: Negative.    Respiratory: Negative.     Gastrointestinal: Negative.         Objective:   The physical exam was conducted virtually.  Physical Exam   Abdominal: Normal appearance.   Neurological: She is alert.       Assessment:     1. Hypothyroidism, unspecified type    2. Medication refill        Plan:       Hypothyroidism, unspecified type  -     levothyroxine (SYNTHROID) 75 MCG tablet; Take 1 tablet (75 mcg total) by mouth once daily.  Dispense: 30 tablet; Refill: 0    Medication refill  -     levothyroxine (SYNTHROID) 75 MCG tablet; Take 1 tablet (75 mcg total) by mouth once daily.  Dispense: 30 tablet; Refill: 0                          [1]   Current Outpatient Medications on File Prior to Visit   Medication Sig Dispense Refill    amLODIPine (NORVASC) 10 MG tablet Take 10 mg by mouth once daily.      hydroCHLOROthiazide (HYDRODIURIL) 12.5 MG Tab Take 12.5 mg by mouth once daily.      ibuprofen (ADVIL,MOTRIN) 800 MG tablet Take 1 tablet (800 mg total) by mouth 3 (three) times daily as needed for Pain. 90 tablet 1    pantoprazole (PROTONIX) 40 MG tablet Take 40 mg by mouth once daily.      sucralfate (CARAFATE) 1 gram tablet Take 1 tablet (1 g total) by mouth 4 (four) times daily as  needed (upper abdominal pain). 30 tablet 0    [DISCONTINUED] levothyroxine (SYNTHROID) 75 MCG tablet Take 1 tablet (75 mcg total) by mouth once daily. 30 tablet 0     No current facility-administered medications on file prior to visit.

## 2025-08-21 ENCOUNTER — HOSPITAL ENCOUNTER (OUTPATIENT)
Dept: RADIOLOGY | Facility: HOSPITAL | Age: 45
Discharge: HOME | End: 2025-08-21
Payer: MEDICAID

## 2025-08-21 ENCOUNTER — OFFICE VISIT (OUTPATIENT)
Dept: ORTHOPEDICS | Facility: CLINIC | Age: 45
End: 2025-08-21
Payer: MEDICAID

## 2025-08-21 VITALS
SYSTOLIC BLOOD PRESSURE: 148 MMHG | WEIGHT: 198.63 LBS | BODY MASS INDEX: 33.09 KG/M2 | TEMPERATURE: 99 F | DIASTOLIC BLOOD PRESSURE: 88 MMHG | OXYGEN SATURATION: 98 % | HEART RATE: 70 BPM | HEIGHT: 65 IN

## 2025-08-21 DIAGNOSIS — M25.561 RIGHT KNEE PAIN, UNSPECIFIED CHRONICITY: ICD-10-CM

## 2025-08-21 DIAGNOSIS — M66.0 RUPTURED BAKERS CYST: Primary | ICD-10-CM

## 2025-08-21 PROCEDURE — 73564 X-RAY EXAM KNEE 4 OR MORE: CPT | Mod: TC,RT

## 2025-08-21 PROCEDURE — 99214 OFFICE O/P EST MOD 30 MIN: CPT | Mod: PBBFAC,25

## 2025-08-21 RX ORDER — MELOXICAM 7.5 MG/1
7.5 TABLET ORAL DAILY
Qty: 30 TABLET | Refills: 1 | Status: SHIPPED | OUTPATIENT
Start: 2025-08-21 | End: 2025-09-20

## (undated) DEVICE — FORCEP ENDOJAW ALGTR W/NDL 2.8

## (undated) DEVICE — SET TUBE PERISTALTIC 9.6MM

## (undated) DEVICE — SEE MEDLINE ITEM 152674

## (undated) DEVICE — BITE BLOCK ADULT

## (undated) DEVICE — KIT SURGICAL COLON .25 1.1OZ